# Patient Record
Sex: FEMALE | Race: WHITE | Employment: OTHER | URBAN - METROPOLITAN AREA
[De-identification: names, ages, dates, MRNs, and addresses within clinical notes are randomized per-mention and may not be internally consistent; named-entity substitution may affect disease eponyms.]

---

## 2020-10-29 ENCOUNTER — HOSPITAL ENCOUNTER (OUTPATIENT)
Dept: PREADMISSION TESTING | Age: 80
Discharge: HOME | End: 2020-11-02
Payer: MEDICARE

## 2020-10-29 LAB
ABO/RH: NORMAL
ANION GAP SERPL CALCULATED.3IONS-SCNC: 11 MMOL/L (ref 3–16)
ANTIBODY SCREEN: NORMAL
APTT: 55.2 SEC (ref 24.2–36.2)
BUN BLDV-MCNC: 28 MG/DL (ref 7–20)
CALCIUM SERPL-MCNC: 9.3 MG/DL (ref 8.3–10.6)
CHLORIDE BLD-SCNC: 106 MMOL/L (ref 99–110)
CO2: 25 MMOL/L (ref 21–32)
CREAT SERPL-MCNC: 1.7 MG/DL (ref 0.6–1.2)
EKG ATRIAL RATE: 375 BPM
EKG DIAGNOSIS: NORMAL
EKG Q-T INTERVAL: 408 MS
EKG QRS DURATION: 84 MS
EKG QTC CALCULATION (BAZETT): 433 MS
EKG R AXIS: 70 DEGREES
EKG T AXIS: -6 DEGREES
EKG VENTRICULAR RATE: 68 BPM
GFR AFRICAN AMERICAN: 35
GFR NON-AFRICAN AMERICAN: 29
GLUCOSE BLD-MCNC: 125 MG/DL (ref 70–99)
HCT VFR BLD CALC: 30.6 % (ref 36–48)
HEMOGLOBIN: 10.4 G/DL (ref 12–16)
INR BLD: 1.82 (ref 0.86–1.14)
MCH RBC QN AUTO: 30.5 PG (ref 26–34)
MCHC RBC AUTO-ENTMCNC: 34 G/DL (ref 31–36)
MCV RBC AUTO: 89.7 FL (ref 80–100)
PDW BLD-RTO: 14.3 % (ref 12.4–15.4)
PLATELET # BLD: 213 K/UL (ref 135–450)
PMV BLD AUTO: 9.4 FL (ref 5–10.5)
POTASSIUM SERPL-SCNC: 4.7 MMOL/L (ref 3.5–5.1)
PROTHROMBIN TIME: 21.2 SEC (ref 10–13.2)
RBC # BLD: 3.42 M/UL (ref 4–5.2)
SODIUM BLD-SCNC: 142 MMOL/L (ref 136–145)
WBC # BLD: 8.6 K/UL (ref 4–11)

## 2020-10-29 PROCEDURE — 80048 BASIC METABOLIC PNL TOTAL CA: CPT

## 2020-10-29 PROCEDURE — 86900 BLOOD TYPING SEROLOGIC ABO: CPT

## 2020-10-29 PROCEDURE — 93010 ELECTROCARDIOGRAM REPORT: CPT | Performed by: INTERNAL MEDICINE

## 2020-10-29 PROCEDURE — 85027 COMPLETE CBC AUTOMATED: CPT

## 2020-10-29 PROCEDURE — 86901 BLOOD TYPING SEROLOGIC RH(D): CPT

## 2020-10-29 PROCEDURE — 86850 RBC ANTIBODY SCREEN: CPT

## 2020-10-29 PROCEDURE — 85610 PROTHROMBIN TIME: CPT

## 2020-10-29 PROCEDURE — 93005 ELECTROCARDIOGRAM TRACING: CPT | Performed by: UROLOGY

## 2020-10-29 PROCEDURE — 85730 THROMBOPLASTIN TIME PARTIAL: CPT

## 2020-10-30 RX ORDER — FLUTICASONE PROPIONATE 50 MCG
2 SPRAY, SUSPENSION (ML) NASAL DAILY PRN
COMMUNITY
Start: 2020-07-20

## 2020-10-30 RX ORDER — LANOLIN ALCOHOL/MO/W.PET/CERES
1000 CREAM (GRAM) TOPICAL DAILY
COMMUNITY

## 2020-10-30 RX ORDER — FUROSEMIDE 20 MG/1
20 TABLET ORAL PRN
COMMUNITY
Start: 2020-10-12

## 2020-10-30 RX ORDER — PANTOPRAZOLE SODIUM 40 MG/1
40 TABLET, DELAYED RELEASE ORAL 2 TIMES DAILY
COMMUNITY
Start: 2020-07-07

## 2020-10-30 NOTE — PROGRESS NOTES
DR. Michelle MORENO'S OFFICE=PCP NOTIFIED TO REQUEST ADDEMDUM FOR H&P WHICH IS OVER 30 DAYS BY 1 DAY

## 2020-11-02 ENCOUNTER — ANESTHESIA EVENT (OUTPATIENT)
Dept: OPERATING ROOM | Age: 80
DRG: 657 | End: 2020-11-02
Payer: MEDICARE

## 2020-11-02 NOTE — PROGRESS NOTES
DR. Jeanine MORENO'S OFFICE CALLED AGAIN TO [facility] TO BE FAXED TO PAT OFFICE FOR HER SURGERY ON 11/3/2020-H&P OUT OF 30 DAYS BY 1 DAY

## 2020-11-03 ENCOUNTER — ANESTHESIA (OUTPATIENT)
Dept: OPERATING ROOM | Age: 80
DRG: 657 | End: 2020-11-03
Payer: MEDICARE

## 2020-11-03 ENCOUNTER — HOSPITAL ENCOUNTER (INPATIENT)
Age: 80
LOS: 7 days | Discharge: HOME | DRG: 657 | End: 2020-11-10
Attending: UROLOGY | Admitting: UROLOGY
Payer: MEDICARE

## 2020-11-03 VITALS
DIASTOLIC BLOOD PRESSURE: 56 MMHG | SYSTOLIC BLOOD PRESSURE: 108 MMHG | OXYGEN SATURATION: 100 % | TEMPERATURE: 96.3 F | RESPIRATION RATE: 10 BRPM

## 2020-11-03 PROBLEM — C64.9 RENAL CELL CANCER, UNSPECIFIED LATERALITY (HCC): Status: ACTIVE | Noted: 2020-11-03

## 2020-11-03 LAB
ABO/RH: NORMAL
ANION GAP SERPL CALCULATED.3IONS-SCNC: 13 MMOL/L (ref 3–16)
ANTIBODY SCREEN: NORMAL
BUN BLDV-MCNC: 30 MG/DL (ref 7–20)
CALCIUM SERPL-MCNC: 7.9 MG/DL (ref 8.3–10.6)
CHLORIDE BLD-SCNC: 106 MMOL/L (ref 99–110)
CO2: 20 MMOL/L (ref 21–32)
CREAT SERPL-MCNC: 1.6 MG/DL (ref 0.6–1.2)
GFR AFRICAN AMERICAN: 37
GFR NON-AFRICAN AMERICAN: 31
GLUCOSE BLD-MCNC: 118 MG/DL (ref 70–99)
GLUCOSE BLD-MCNC: 167 MG/DL (ref 70–99)
GLUCOSE BLD-MCNC: 184 MG/DL (ref 70–99)
GLUCOSE BLD-MCNC: 185 MG/DL (ref 70–99)
HCT VFR BLD CALC: 25 % (ref 36–48)
HEMOGLOBIN: 8.5 G/DL (ref 12–16)
PERFORMED ON: ABNORMAL
POTASSIUM SERPL-SCNC: 4.8 MMOL/L (ref 3.5–5.1)
SODIUM BLD-SCNC: 139 MMOL/L (ref 136–145)

## 2020-11-03 PROCEDURE — 6370000000 HC RX 637 (ALT 250 FOR IP): Performed by: NURSE ANESTHETIST, CERTIFIED REGISTERED

## 2020-11-03 PROCEDURE — 36415 COLL VENOUS BLD VENIPUNCTURE: CPT

## 2020-11-03 PROCEDURE — 2580000003 HC RX 258: Performed by: UROLOGY

## 2020-11-03 PROCEDURE — C9290 INJ, BUPIVACAINE LIPOSOME: HCPCS | Performed by: UROLOGY

## 2020-11-03 PROCEDURE — 88331 PATH CONSLTJ SURG 1 BLK 1SPC: CPT

## 2020-11-03 PROCEDURE — 6360000002 HC RX W HCPCS: Performed by: UROLOGY

## 2020-11-03 PROCEDURE — 6360000002 HC RX W HCPCS: Performed by: ANESTHESIOLOGY

## 2020-11-03 PROCEDURE — 88307 TISSUE EXAM BY PATHOLOGIST: CPT

## 2020-11-03 PROCEDURE — 2500000003 HC RX 250 WO HCPCS: Performed by: NURSE ANESTHETIST, CERTIFIED REGISTERED

## 2020-11-03 PROCEDURE — 86901 BLOOD TYPING SEROLOGIC RH(D): CPT

## 2020-11-03 PROCEDURE — 86850 RBC ANTIBODY SCREEN: CPT

## 2020-11-03 PROCEDURE — 85018 HEMOGLOBIN: CPT

## 2020-11-03 PROCEDURE — 2780000010 HC IMPLANT OTHER: Performed by: UROLOGY

## 2020-11-03 PROCEDURE — 3600000015 HC SURGERY LEVEL 5 ADDTL 15MIN: Performed by: UROLOGY

## 2020-11-03 PROCEDURE — 3700000001 HC ADD 15 MINUTES (ANESTHESIA): Performed by: UROLOGY

## 2020-11-03 PROCEDURE — 2580000003 HC RX 258: Performed by: ANESTHESIOLOGY

## 2020-11-03 PROCEDURE — 85014 HEMATOCRIT: CPT

## 2020-11-03 PROCEDURE — 3600000005 HC SURGERY LEVEL 5 BASE: Performed by: UROLOGY

## 2020-11-03 PROCEDURE — 1200000000 HC SEMI PRIVATE

## 2020-11-03 PROCEDURE — 86900 BLOOD TYPING SEROLOGIC ABO: CPT

## 2020-11-03 PROCEDURE — 6360000002 HC RX W HCPCS: Performed by: NURSE ANESTHETIST, CERTIFIED REGISTERED

## 2020-11-03 PROCEDURE — P9045 ALBUMIN (HUMAN), 5%, 250 ML: HCPCS | Performed by: NURSE ANESTHETIST, CERTIFIED REGISTERED

## 2020-11-03 PROCEDURE — 80048 BASIC METABOLIC PNL TOTAL CA: CPT

## 2020-11-03 PROCEDURE — 2500000003 HC RX 250 WO HCPCS

## 2020-11-03 PROCEDURE — 2580000003 HC RX 258: Performed by: NURSE ANESTHETIST, CERTIFIED REGISTERED

## 2020-11-03 PROCEDURE — 2709999900 HC NON-CHARGEABLE SUPPLY: Performed by: UROLOGY

## 2020-11-03 PROCEDURE — 7100000000 HC PACU RECOVERY - FIRST 15 MIN: Performed by: UROLOGY

## 2020-11-03 PROCEDURE — 3700000000 HC ANESTHESIA ATTENDED CARE: Performed by: UROLOGY

## 2020-11-03 PROCEDURE — 0TB10ZZ EXCISION OF LEFT KIDNEY, OPEN APPROACH: ICD-10-PCS | Performed by: UROLOGY

## 2020-11-03 PROCEDURE — 7100000001 HC PACU RECOVERY - ADDTL 15 MIN: Performed by: UROLOGY

## 2020-11-03 PROCEDURE — 6370000000 HC RX 637 (ALT 250 FOR IP): Performed by: UROLOGY

## 2020-11-03 RX ORDER — OXYCODONE HYDROCHLORIDE 10 MG/1
10 TABLET ORAL PRN
Status: DISCONTINUED | OUTPATIENT
Start: 2020-11-03 | End: 2020-11-03 | Stop reason: HOSPADM

## 2020-11-03 RX ORDER — PROMETHAZINE HYDROCHLORIDE 25 MG/1
12.5 TABLET ORAL EVERY 6 HOURS PRN
Status: DISCONTINUED | OUTPATIENT
Start: 2020-11-03 | End: 2020-11-10 | Stop reason: HOSPADM

## 2020-11-03 RX ORDER — MEPERIDINE HYDROCHLORIDE 25 MG/ML
12.5 INJECTION INTRAMUSCULAR; INTRAVENOUS; SUBCUTANEOUS EVERY 5 MIN PRN
Status: DISCONTINUED | OUTPATIENT
Start: 2020-11-03 | End: 2020-11-03 | Stop reason: HOSPADM

## 2020-11-03 RX ORDER — FENTANYL CITRATE 50 UG/ML
INJECTION, SOLUTION INTRAMUSCULAR; INTRAVENOUS PRN
Status: DISCONTINUED | OUTPATIENT
Start: 2020-11-03 | End: 2020-11-03 | Stop reason: SDUPTHER

## 2020-11-03 RX ORDER — ONDANSETRON 2 MG/ML
4 INJECTION INTRAMUSCULAR; INTRAVENOUS
Status: DISCONTINUED | OUTPATIENT
Start: 2020-11-03 | End: 2020-11-03 | Stop reason: HOSPADM

## 2020-11-03 RX ORDER — DEXTROSE MONOHYDRATE 50 MG/ML
100 INJECTION, SOLUTION INTRAVENOUS PRN
Status: DISCONTINUED | OUTPATIENT
Start: 2020-11-03 | End: 2020-11-10 | Stop reason: HOSPADM

## 2020-11-03 RX ORDER — ACETAMINOPHEN 325 MG/1
TABLET ORAL PRN
Status: DISCONTINUED | OUTPATIENT
Start: 2020-11-03 | End: 2020-11-03 | Stop reason: SDUPTHER

## 2020-11-03 RX ORDER — DEXTROSE MONOHYDRATE 25 G/50ML
12.5 INJECTION, SOLUTION INTRAVENOUS PRN
Status: DISCONTINUED | OUTPATIENT
Start: 2020-11-03 | End: 2020-11-10 | Stop reason: HOSPADM

## 2020-11-03 RX ORDER — MAGNESIUM HYDROXIDE 1200 MG/15ML
LIQUID ORAL CONTINUOUS PRN
Status: COMPLETED | OUTPATIENT
Start: 2020-11-03 | End: 2020-11-03

## 2020-11-03 RX ORDER — LIDOCAINE HYDROCHLORIDE 20 MG/ML
INJECTION, SOLUTION EPIDURAL; INFILTRATION; INTRACAUDAL; PERINEURAL PRN
Status: DISCONTINUED | OUTPATIENT
Start: 2020-11-03 | End: 2020-11-03 | Stop reason: SDUPTHER

## 2020-11-03 RX ORDER — KETAMINE HCL IN NACL, ISO-OSM 100MG/10ML
SYRINGE (ML) INJECTION PRN
Status: DISCONTINUED | OUTPATIENT
Start: 2020-11-03 | End: 2020-11-03 | Stop reason: SDUPTHER

## 2020-11-03 RX ORDER — EPHEDRINE SULFATE/0.9% NACL/PF 50 MG/5 ML
SYRINGE (ML) INTRAVENOUS PRN
Status: DISCONTINUED | OUTPATIENT
Start: 2020-11-03 | End: 2020-11-03 | Stop reason: SDUPTHER

## 2020-11-03 RX ORDER — SODIUM CHLORIDE 0.9 % (FLUSH) 0.9 %
10 SYRINGE (ML) INJECTION EVERY 12 HOURS SCHEDULED
Status: DISCONTINUED | OUTPATIENT
Start: 2020-11-03 | End: 2020-11-03 | Stop reason: HOSPADM

## 2020-11-03 RX ORDER — SODIUM CHLORIDE 9 MG/ML
INJECTION, SOLUTION INTRAVENOUS CONTINUOUS PRN
Status: DISCONTINUED | OUTPATIENT
Start: 2020-11-03 | End: 2020-11-03 | Stop reason: SDUPTHER

## 2020-11-03 RX ORDER — NORTRIPTYLINE HYDROCHLORIDE 10 MG/1
10 CAPSULE ORAL NIGHTLY
Status: DISCONTINUED | OUTPATIENT
Start: 2020-11-03 | End: 2020-11-10 | Stop reason: HOSPADM

## 2020-11-03 RX ORDER — ROCURONIUM BROMIDE 10 MG/ML
INJECTION, SOLUTION INTRAVENOUS PRN
Status: DISCONTINUED | OUTPATIENT
Start: 2020-11-03 | End: 2020-11-03 | Stop reason: SDUPTHER

## 2020-11-03 RX ORDER — SODIUM CHLORIDE 0.9 % (FLUSH) 0.9 %
10 SYRINGE (ML) INJECTION PRN
Status: DISCONTINUED | OUTPATIENT
Start: 2020-11-03 | End: 2020-11-03 | Stop reason: HOSPADM

## 2020-11-03 RX ORDER — MORPHINE SULFATE 2 MG/ML
2 INJECTION, SOLUTION INTRAMUSCULAR; INTRAVENOUS EVERY 5 MIN PRN
Status: DISCONTINUED | OUTPATIENT
Start: 2020-11-03 | End: 2020-11-03 | Stop reason: HOSPADM

## 2020-11-03 RX ORDER — MORPHINE SULFATE 4 MG/ML
4 INJECTION, SOLUTION INTRAMUSCULAR; INTRAVENOUS
Status: DISCONTINUED | OUTPATIENT
Start: 2020-11-03 | End: 2020-11-04

## 2020-11-03 RX ORDER — FENTANYL CITRATE 50 UG/ML
50 INJECTION, SOLUTION INTRAMUSCULAR; INTRAVENOUS EVERY 5 MIN PRN
Status: DISCONTINUED | OUTPATIENT
Start: 2020-11-03 | End: 2020-11-03 | Stop reason: HOSPADM

## 2020-11-03 RX ORDER — PANTOPRAZOLE SODIUM 40 MG/1
40 TABLET, DELAYED RELEASE ORAL
Status: DISCONTINUED | OUTPATIENT
Start: 2020-11-04 | End: 2020-11-10 | Stop reason: HOSPADM

## 2020-11-03 RX ORDER — MORPHINE SULFATE 2 MG/ML
1 INJECTION, SOLUTION INTRAMUSCULAR; INTRAVENOUS EVERY 5 MIN PRN
Status: DISCONTINUED | OUTPATIENT
Start: 2020-11-03 | End: 2020-11-03 | Stop reason: HOSPADM

## 2020-11-03 RX ORDER — OXYCODONE HYDROCHLORIDE 5 MG/1
5 TABLET ORAL EVERY 4 HOURS PRN
Status: DISCONTINUED | OUTPATIENT
Start: 2020-11-03 | End: 2020-11-04

## 2020-11-03 RX ORDER — DEXMEDETOMIDINE HYDROCHLORIDE 100 UG/ML
INJECTION, SOLUTION INTRAVENOUS PRN
Status: DISCONTINUED | OUTPATIENT
Start: 2020-11-03 | End: 2020-11-03 | Stop reason: SDUPTHER

## 2020-11-03 RX ORDER — MORPHINE SULFATE 2 MG/ML
2 INJECTION, SOLUTION INTRAMUSCULAR; INTRAVENOUS
Status: DISCONTINUED | OUTPATIENT
Start: 2020-11-03 | End: 2020-11-04

## 2020-11-03 RX ORDER — FENTANYL CITRATE 50 UG/ML
25 INJECTION, SOLUTION INTRAMUSCULAR; INTRAVENOUS EVERY 5 MIN PRN
Status: DISCONTINUED | OUTPATIENT
Start: 2020-11-03 | End: 2020-11-03 | Stop reason: HOSPADM

## 2020-11-03 RX ORDER — PREGABALIN 75 MG/1
150 CAPSULE ORAL 3 TIMES DAILY
Status: DISCONTINUED | OUTPATIENT
Start: 2020-11-03 | End: 2020-11-06

## 2020-11-03 RX ORDER — PHENYLEPHRINE HCL IN 0.9% NACL 1 MG/10 ML
SYRINGE (ML) INTRAVENOUS PRN
Status: DISCONTINUED | OUTPATIENT
Start: 2020-11-03 | End: 2020-11-03 | Stop reason: SDUPTHER

## 2020-11-03 RX ORDER — DEXAMETHASONE SODIUM PHOSPHATE 4 MG/ML
INJECTION, SOLUTION INTRA-ARTICULAR; INTRALESIONAL; INTRAMUSCULAR; INTRAVENOUS; SOFT TISSUE PRN
Status: DISCONTINUED | OUTPATIENT
Start: 2020-11-03 | End: 2020-11-03 | Stop reason: SDUPTHER

## 2020-11-03 RX ORDER — ONDANSETRON 2 MG/ML
INJECTION INTRAMUSCULAR; INTRAVENOUS PRN
Status: DISCONTINUED | OUTPATIENT
Start: 2020-11-03 | End: 2020-11-03 | Stop reason: SDUPTHER

## 2020-11-03 RX ORDER — MANNITOL 250 MG/ML
INJECTION, SOLUTION INTRAVENOUS PRN
Status: DISCONTINUED | OUTPATIENT
Start: 2020-11-03 | End: 2020-11-03 | Stop reason: SDUPTHER

## 2020-11-03 RX ORDER — SODIUM CHLORIDE 0.9 % (FLUSH) 0.9 %
10 SYRINGE (ML) INJECTION EVERY 12 HOURS SCHEDULED
Status: DISCONTINUED | OUTPATIENT
Start: 2020-11-03 | End: 2020-11-10 | Stop reason: HOSPADM

## 2020-11-03 RX ORDER — SODIUM CHLORIDE 9 MG/ML
INJECTION, SOLUTION INTRAVENOUS CONTINUOUS
Status: DISCONTINUED | OUTPATIENT
Start: 2020-11-03 | End: 2020-11-07

## 2020-11-03 RX ORDER — OXYCODONE HYDROCHLORIDE 5 MG/1
5 TABLET ORAL PRN
Status: DISCONTINUED | OUTPATIENT
Start: 2020-11-03 | End: 2020-11-03 | Stop reason: HOSPADM

## 2020-11-03 RX ORDER — ALBUMIN, HUMAN INJ 5% 5 %
SOLUTION INTRAVENOUS PRN
Status: DISCONTINUED | OUTPATIENT
Start: 2020-11-03 | End: 2020-11-03 | Stop reason: SDUPTHER

## 2020-11-03 RX ORDER — SODIUM CHLORIDE 0.9 % (FLUSH) 0.9 %
10 SYRINGE (ML) INJECTION PRN
Status: DISCONTINUED | OUTPATIENT
Start: 2020-11-03 | End: 2020-11-10 | Stop reason: HOSPADM

## 2020-11-03 RX ORDER — FLUTICASONE PROPIONATE 50 MCG
2 SPRAY, SUSPENSION (ML) NASAL DAILY
Status: DISCONTINUED | OUTPATIENT
Start: 2020-11-04 | End: 2020-11-10 | Stop reason: HOSPADM

## 2020-11-03 RX ORDER — ONDANSETRON 2 MG/ML
4 INJECTION INTRAMUSCULAR; INTRAVENOUS EVERY 6 HOURS PRN
Status: DISCONTINUED | OUTPATIENT
Start: 2020-11-03 | End: 2020-11-10 | Stop reason: HOSPADM

## 2020-11-03 RX ORDER — PROPOFOL 10 MG/ML
INJECTION, EMULSION INTRAVENOUS PRN
Status: DISCONTINUED | OUTPATIENT
Start: 2020-11-03 | End: 2020-11-03 | Stop reason: SDUPTHER

## 2020-11-03 RX ORDER — SODIUM CHLORIDE 9 MG/ML
INJECTION, SOLUTION INTRAVENOUS CONTINUOUS
Status: DISCONTINUED | OUTPATIENT
Start: 2020-11-03 | End: 2020-11-03

## 2020-11-03 RX ORDER — GLYCOPYRROLATE 0.2 MG/ML
INJECTION INTRAMUSCULAR; INTRAVENOUS PRN
Status: DISCONTINUED | OUTPATIENT
Start: 2020-11-03 | End: 2020-11-03 | Stop reason: SDUPTHER

## 2020-11-03 RX ORDER — NICOTINE POLACRILEX 4 MG
15 LOZENGE BUCCAL PRN
Status: DISCONTINUED | OUTPATIENT
Start: 2020-11-03 | End: 2020-11-10 | Stop reason: HOSPADM

## 2020-11-03 RX ADMIN — NORTRIPTYLINE HYDROCHLORIDE 10 MG: 10 CAPSULE ORAL at 21:08

## 2020-11-03 RX ADMIN — LIDOCAINE HYDROCHLORIDE 100 MG: 20 INJECTION, SOLUTION EPIDURAL; INFILTRATION; INTRACAUDAL; PERINEURAL at 14:08

## 2020-11-03 RX ADMIN — SODIUM CHLORIDE: 9 INJECTION, SOLUTION INTRAVENOUS at 16:39

## 2020-11-03 RX ADMIN — SODIUM CHLORIDE: 9 INJECTION, SOLUTION INTRAVENOUS at 14:18

## 2020-11-03 RX ADMIN — SODIUM CHLORIDE: 9 INJECTION, SOLUTION INTRAVENOUS at 20:56

## 2020-11-03 RX ADMIN — Medication 10 MG: at 16:00

## 2020-11-03 RX ADMIN — DEXAMETHASONE SODIUM PHOSPHATE 4 MG: 4 INJECTION, SOLUTION INTRAMUSCULAR; INTRAVENOUS at 14:39

## 2020-11-03 RX ADMIN — Medication 10 MG: at 14:29

## 2020-11-03 RX ADMIN — DEXMEDETOMIDINE 0.3 MCG: 100 INJECTION, SOLUTION, CONCENTRATE INTRAVENOUS at 16:36

## 2020-11-03 RX ADMIN — HYDROMORPHONE HYDROCHLORIDE 0.5 MG: 1 INJECTION, SOLUTION INTRAMUSCULAR; INTRAVENOUS; SUBCUTANEOUS at 17:54

## 2020-11-03 RX ADMIN — Medication 10 MG: at 16:42

## 2020-11-03 RX ADMIN — HYDROMORPHONE HYDROCHLORIDE 0.5 MG: 1 INJECTION, SOLUTION INTRAMUSCULAR; INTRAVENOUS; SUBCUTANEOUS at 17:31

## 2020-11-03 RX ADMIN — Medication 100 MCG: at 14:36

## 2020-11-03 RX ADMIN — ONDANSETRON 4 MG: 2 INJECTION INTRAMUSCULAR; INTRAVENOUS at 14:39

## 2020-11-03 RX ADMIN — INSULIN LISPRO 1 UNITS: 100 INJECTION, SOLUTION INTRAVENOUS; SUBCUTANEOUS at 20:57

## 2020-11-03 RX ADMIN — ROCURONIUM BROMIDE 10 MG: 10 INJECTION INTRAVENOUS at 14:08

## 2020-11-03 RX ADMIN — Medication 10 MG: at 14:32

## 2020-11-03 RX ADMIN — FENTANYL CITRATE 50 MCG: 50 INJECTION, SOLUTION INTRAMUSCULAR; INTRAVENOUS at 17:24

## 2020-11-03 RX ADMIN — SUGAMMADEX 250 MG: 100 INJECTION, SOLUTION INTRAVENOUS at 16:37

## 2020-11-03 RX ADMIN — PROPOFOL 150 MG: 10 INJECTION, EMULSION INTRAVENOUS at 14:08

## 2020-11-03 RX ADMIN — MANNITOL 12.5 G: 250 INJECTION, SOLUTION INTRAVENOUS at 15:41

## 2020-11-03 RX ADMIN — MORPHINE SULFATE 4 MG: 4 INJECTION INTRAVENOUS at 21:12

## 2020-11-03 RX ADMIN — Medication 20 MG: at 14:07

## 2020-11-03 RX ADMIN — ACETAMINOPHEN 1000 MG: 325 TABLET ORAL at 16:38

## 2020-11-03 RX ADMIN — DEXMEDETOMIDINE 0.5 MCG: 100 INJECTION, SOLUTION, CONCENTRATE INTRAVENOUS at 14:53

## 2020-11-03 RX ADMIN — FENTANYL CITRATE 25 MCG: 50 INJECTION INTRAMUSCULAR; INTRAVENOUS at 14:02

## 2020-11-03 RX ADMIN — HYDROMORPHONE HYDROCHLORIDE 0.5 MG: 1 INJECTION, SOLUTION INTRAMUSCULAR; INTRAVENOUS; SUBCUTANEOUS at 17:46

## 2020-11-03 RX ADMIN — DEXMEDETOMIDINE 0.3 MCG: 100 INJECTION, SOLUTION, CONCENTRATE INTRAVENOUS at 16:28

## 2020-11-03 RX ADMIN — GLYCOPYRROLATE 0.2 MG: 0.2 INJECTION, SOLUTION INTRAMUSCULAR; INTRAVENOUS at 14:00

## 2020-11-03 RX ADMIN — Medication 10 ML: at 21:10

## 2020-11-03 RX ADMIN — CEFAZOLIN SODIUM 2 G: 10 INJECTION, POWDER, FOR SOLUTION INTRAVENOUS at 20:56

## 2020-11-03 RX ADMIN — Medication 10 MG: at 15:25

## 2020-11-03 RX ADMIN — ROCURONIUM BROMIDE 40 MG: 10 INJECTION INTRAVENOUS at 14:09

## 2020-11-03 RX ADMIN — PREGABALIN 150 MG: 75 CAPSULE ORAL at 20:56

## 2020-11-03 RX ADMIN — ROCURONIUM BROMIDE 30 MG: 10 INJECTION INTRAVENOUS at 15:48

## 2020-11-03 RX ADMIN — MORPHINE SULFATE 4 MG: 4 INJECTION INTRAVENOUS at 18:59

## 2020-11-03 RX ADMIN — FENTANYL CITRATE 25 MCG: 50 INJECTION INTRAMUSCULAR; INTRAVENOUS at 14:00

## 2020-11-03 RX ADMIN — DEXMEDETOMIDINE 0.3 MCG: 100 INJECTION, SOLUTION, CONCENTRATE INTRAVENOUS at 14:30

## 2020-11-03 RX ADMIN — FENTANYL CITRATE 50 MCG: 50 INJECTION, SOLUTION INTRAMUSCULAR; INTRAVENOUS at 17:16

## 2020-11-03 RX ADMIN — CEFTRIAXONE 2 G: 2 INJECTION, POWDER, FOR SOLUTION INTRAMUSCULAR; INTRAVENOUS at 14:01

## 2020-11-03 RX ADMIN — DEXMEDETOMIDINE 0.7 MCG: 100 INJECTION, SOLUTION, CONCENTRATE INTRAVENOUS at 14:20

## 2020-11-03 RX ADMIN — SODIUM CHLORIDE: 9 INJECTION, SOLUTION INTRAVENOUS at 12:33

## 2020-11-03 RX ADMIN — ALBUMIN (HUMAN) 250 ML: 12.5 SOLUTION INTRAVENOUS at 15:20

## 2020-11-03 RX ADMIN — OXYCODONE HYDROCHLORIDE 5 MG: 5 TABLET ORAL at 23:58

## 2020-11-03 RX ADMIN — HYDROMORPHONE HYDROCHLORIDE 0.5 MG: 1 INJECTION, SOLUTION INTRAMUSCULAR; INTRAVENOUS; SUBCUTANEOUS at 17:39

## 2020-11-03 ASSESSMENT — PULMONARY FUNCTION TESTS
PIF_VALUE: 24
PIF_VALUE: 17
PIF_VALUE: 24
PIF_VALUE: 21
PIF_VALUE: 23
PIF_VALUE: 22
PIF_VALUE: 24
PIF_VALUE: 22
PIF_VALUE: 18
PIF_VALUE: 25
PIF_VALUE: 17
PIF_VALUE: 24
PIF_VALUE: 25
PIF_VALUE: 21
PIF_VALUE: 24
PIF_VALUE: 1
PIF_VALUE: 22
PIF_VALUE: 3
PIF_VALUE: 19
PIF_VALUE: 17
PIF_VALUE: 1
PIF_VALUE: 5
PIF_VALUE: 18
PIF_VALUE: 31
PIF_VALUE: 21
PIF_VALUE: 20
PIF_VALUE: 18
PIF_VALUE: 23
PIF_VALUE: 20
PIF_VALUE: 24
PIF_VALUE: 24
PIF_VALUE: 21
PIF_VALUE: 25
PIF_VALUE: 24
PIF_VALUE: 24
PIF_VALUE: 20
PIF_VALUE: 24
PIF_VALUE: 24
PIF_VALUE: 21
PIF_VALUE: 23
PIF_VALUE: 21
PIF_VALUE: 4
PIF_VALUE: 20
PIF_VALUE: 25
PIF_VALUE: 18
PIF_VALUE: 19
PIF_VALUE: 20
PIF_VALUE: 22
PIF_VALUE: 19
PIF_VALUE: 19
PIF_VALUE: 24
PIF_VALUE: 24
PIF_VALUE: 20
PIF_VALUE: 24
PIF_VALUE: 24
PIF_VALUE: 19
PIF_VALUE: 23
PIF_VALUE: 24
PIF_VALUE: 1
PIF_VALUE: 24
PIF_VALUE: 20
PIF_VALUE: 24
PIF_VALUE: 18
PIF_VALUE: 24
PIF_VALUE: 21
PIF_VALUE: 20
PIF_VALUE: 22
PIF_VALUE: 24
PIF_VALUE: 20
PIF_VALUE: 23
PIF_VALUE: 24
PIF_VALUE: 25
PIF_VALUE: 20
PIF_VALUE: 23
PIF_VALUE: 22
PIF_VALUE: 1
PIF_VALUE: 20
PIF_VALUE: 18
PIF_VALUE: 24
PIF_VALUE: 20
PIF_VALUE: 24
PIF_VALUE: 21
PIF_VALUE: 20
PIF_VALUE: 18
PIF_VALUE: 20
PIF_VALUE: 24
PIF_VALUE: 19
PIF_VALUE: 21
PIF_VALUE: 25
PIF_VALUE: 21
PIF_VALUE: 20
PIF_VALUE: 18
PIF_VALUE: 19
PIF_VALUE: 24
PIF_VALUE: 23
PIF_VALUE: 25
PIF_VALUE: 11
PIF_VALUE: 1
PIF_VALUE: 20
PIF_VALUE: 23
PIF_VALUE: 4
PIF_VALUE: 18
PIF_VALUE: 20
PIF_VALUE: 24
PIF_VALUE: 20
PIF_VALUE: 23
PIF_VALUE: 17
PIF_VALUE: 1
PIF_VALUE: 24
PIF_VALUE: 20
PIF_VALUE: 17
PIF_VALUE: 23
PIF_VALUE: 19
PIF_VALUE: 23
PIF_VALUE: 19
PIF_VALUE: 23
PIF_VALUE: 20
PIF_VALUE: 22
PIF_VALUE: 19
PIF_VALUE: 20
PIF_VALUE: 23
PIF_VALUE: 2
PIF_VALUE: 24
PIF_VALUE: 23
PIF_VALUE: 21
PIF_VALUE: 19
PIF_VALUE: 24
PIF_VALUE: 19
PIF_VALUE: 3
PIF_VALUE: 23
PIF_VALUE: 4
PIF_VALUE: 24
PIF_VALUE: 19
PIF_VALUE: 20
PIF_VALUE: 23
PIF_VALUE: 24
PIF_VALUE: 21
PIF_VALUE: 24
PIF_VALUE: 21
PIF_VALUE: 18
PIF_VALUE: 24
PIF_VALUE: 25
PIF_VALUE: 24
PIF_VALUE: 19
PIF_VALUE: 21
PIF_VALUE: 2
PIF_VALUE: 18
PIF_VALUE: 7
PIF_VALUE: 18
PIF_VALUE: 3
PIF_VALUE: 24
PIF_VALUE: 20
PIF_VALUE: 25
PIF_VALUE: 22
PIF_VALUE: 1
PIF_VALUE: 18
PIF_VALUE: 19
PIF_VALUE: 24
PIF_VALUE: 19
PIF_VALUE: 23
PIF_VALUE: 3
PIF_VALUE: 20
PIF_VALUE: 24

## 2020-11-03 ASSESSMENT — PAIN DESCRIPTION - LOCATION
LOCATION: FLANK

## 2020-11-03 ASSESSMENT — PAIN DESCRIPTION - FREQUENCY
FREQUENCY: CONTINUOUS

## 2020-11-03 ASSESSMENT — PAIN - FUNCTIONAL ASSESSMENT
PAIN_FUNCTIONAL_ASSESSMENT: PREVENTS OR INTERFERES SOME ACTIVE ACTIVITIES AND ADLS
PAIN_FUNCTIONAL_ASSESSMENT: 0-10

## 2020-11-03 ASSESSMENT — PAIN DESCRIPTION - ORIENTATION
ORIENTATION: LEFT

## 2020-11-03 ASSESSMENT — PAIN DESCRIPTION - ONSET
ONSET: ON-GOING

## 2020-11-03 ASSESSMENT — PAIN DESCRIPTION - DESCRIPTORS
DESCRIPTORS: ACHING;BURNING;CONSTANT
DESCRIPTORS: ACHING;BURNING;CONSTANT
DESCRIPTORS: ACHING
DESCRIPTORS: ACHING;BURNING;CONSTANT
DESCRIPTORS: ACHING;BURNING;CONSTANT
DESCRIPTORS: ACHING
DESCRIPTORS: ACHING
DESCRIPTORS: ACHING;BURNING;SHOOTING
DESCRIPTORS: ACHING;BURNING;CONSTANT

## 2020-11-03 ASSESSMENT — PAIN SCALES - GENERAL
PAINLEVEL_OUTOF10: 9
PAINLEVEL_OUTOF10: 6
PAINLEVEL_OUTOF10: 8
PAINLEVEL_OUTOF10: 9
PAINLEVEL_OUTOF10: 10

## 2020-11-03 ASSESSMENT — PAIN DESCRIPTION - PAIN TYPE
TYPE: SURGICAL PAIN

## 2020-11-03 ASSESSMENT — PAIN SCALES - WONG BAKER
WONGBAKER_NUMERICALRESPONSE: 0
WONGBAKER_NUMERICALRESPONSE: 0

## 2020-11-03 ASSESSMENT — PAIN DESCRIPTION - PROGRESSION
CLINICAL_PROGRESSION: GRADUALLY IMPROVING
CLINICAL_PROGRESSION: GRADUALLY WORSENING
CLINICAL_PROGRESSION: GRADUALLY WORSENING

## 2020-11-03 NOTE — BRIEF OP NOTE
Brief Postoperative Note      Patient: Lawanda Mendez  YOB: 1940  MRN: [id number]    Date of Procedure: 11/3/2020    Pre-Op Diagnosis:renal cell cancer     Post-Op Diagnosis: Same       Procedure(s):  LEFT OPEN PARTIAL NEPHRECTOMY    Surgeon(s):  Madeline Milan MD    Assistant:  Surgical Assistant: Josy Ernst Assistant: Zach Jeter    Anesthesia: General    Estimated Blood Loss (mL): 463 ml    Complications: None    Specimens:   ID Type Source Tests Collected by Time Destination   A : left upper colorenal mass - please identify clear margins Tissue Kidney SURGICAL PATHOLOGY Madeline Milan MD 11/3/2020 1545        Implants:  * No implants in log *      Drains:   Urethral Catheter Non-latex (Active)       Findings: as expected     Electronically signed by Brad Beltrán MD on 11/3/2020 at 4:57 PM

## 2020-11-03 NOTE — ANESTHESIA POSTPROCEDURE EVALUATION
Department of Anesthesiology  Postprocedure Note    Patient: Richard Abebe  MRN: [id number]  YOB: 1940  Date of evaluation: 11/3/2020  Time:  5:36 PM     Procedure Summary     Date:  11/03/20 Room / Location:  [facility]  / [facility]    Anesthesia Start:  1401 Anesthesia Stop:  7907    Procedure:  LEFT OPEN PARTIAL NEPHRECTOMY (Left ) Diagnosis:       Neoplasm of uncertain behavior of left kidney      (neoplasm of uncertain behavior of left kidney)    Surgeon:  Poppy Eaton MD Responsible Provider:  Saintclair Amos, MD    Anesthesia Type:  general ASA Status:  3          Anesthesia Type: general    Supa Phase I: Supa Score: 7    Supa Phase II:      Last vitals: Reviewed and per EMR flowsheets.        Anesthesia Post Evaluation    Patient location during evaluation: PACU  Patient participation: complete - patient participated  Level of consciousness: awake and alert  Pain score: 5  Airway patency: patent  Nausea & Vomiting: no nausea and no vomiting  Complications: no  Cardiovascular status: blood pressure returned to baseline  Respiratory status: acceptable  Hydration status: euvolemic

## 2020-11-03 NOTE — ANESTHESIA PRE PROCEDURE
Department of Anesthesiology  Preprocedure Note       Name:  Aisha Nguyen   Age:  78 y.o.  :  1940                                          MRN:  [id number]         Date:  11/3/2020      Surgeon: Radha Aquino):  Shiva Skaggs MD    Procedure: Procedure(s):  LEFT OPEN PARTIAL NEPHRECTOMY    Medications prior to admission:   Prior to Admission medications    Medication Sig Start Date End Date Taking?  Authorizing Provider   apixaban (ELIQUIS) 5 MG TABS tablet Take 5 mg by mouth 2 times daily 20  Yes Historical Provider, MD   furosemide (LASIX) 20 MG tablet Take 20 mg by mouth as needed 10/12/20  Yes Historical Provider, MD   pantoprazole (PROTONIX) 40 MG tablet Take 40 mg by mouth 2 times daily 20  Yes Historical Provider, MD   fluticasone (FLONASE) 50 MCG/ACT nasal spray 2 sprays by Nasal route daily 20  Yes Historical Provider, MD   vitamin B-12 (CYANOCOBALAMIN) 100 MCG tablet Take 50 mcg by mouth daily   Yes Historical Provider, MD   metFORMIN (GLUCOPHAGE) 500 MG tablet Take 500 mg by mouth 2 times daily (with meals)   Yes Historical Provider, MD   simvastatin (ZOCOR) 10 MG tablet Take 10 mg by mouth nightly   Yes Historical Provider, MD   diltiazem (CARDIZEM) 60 MG tablet Take 60 mg by mouth 2 times daily   Yes Historical Provider, MD   nortriptyline (PAMELOR) 10 MG capsule Take 10 mg by mouth nightly Indications: as directed   Yes Historical Provider, MD   traMADol (ULTRAM) 50 MG tablet Take 50 mg by mouth 4 times daily Indications: prn   Yes Historical Provider, MD   pregabalin (LYRICA) 150 MG capsule Take 150 mg by mouth three times daily   Yes Historical Provider, MD   Alpha-Lipoic Acid 600 MG CAPS Take 600 mg by mouth daily   Yes Historical Provider, MD       Current medications:    Current Facility-Administered Medications   Medication Dose Route Frequency Provider Last Rate Last Dose    cefTRIAXone (ROCEPHIN) 2 g IVPB in D5W 50ml minibag  2 g Intravenous Q24H Shiva Skaggs MD  0.9 % sodium chloride infusion   Intravenous Continuous Jan Castro MD        sodium chloride flush 0.9 % injection 10 mL  10 mL Intravenous 2 times per day Jan Castro MD        sodium chloride flush 0.9 % injection 10 mL  10 mL Intravenous PRN Jan Castro MD           Allergies:  No Known Allergies    Problem List:  There is no problem list on file for this patient. Past Medical History:        Diagnosis Date    [facility]     Diabetes mellitus ([facility]     Hyperlipidemia     Hypertension     Neuropathy     Pacemaker     Renal [facility] ([facility]        Past Surgical History:        Procedure Laterality Date    CHOLECYSTECTOMY      COLONOSCOPY      PACEMAKER PLACEMENT      TONSILLECTOMY         Social History:    Social History     Tobacco Use    Smoking status: Never Smoker    Smokeless tobacco: Never Used   Substance Use Topics    Alcohol use: Not Currently                                Counseling given: Not Answered      Vital Signs (Current):   Vitals:    10/30/20 1203   Weight: 174 lb (78.9 kg)   Height: 5' 5\" (1.651 m)                                              BP Readings from Last 3 Encounters:   10/17/16 117/70       NPO Status:                                                                                 BMI:   Wt Readings from Last 3 Encounters:   10/30/20 174 lb (78.9 kg)     Body mass index is 28.96 kg/m².     CBC:   Lab Results   Component Value Date    WBC 8.6 10/29/2020    RBC 3.42 10/29/2020    RBC 4.76 10/17/2016    HGB 10.4 10/29/2020    HCT 30.6 10/29/2020    MCV 89.7 10/29/2020    RDW 14.3 10/29/2020     10/29/2020       CMP:   Lab Results   Component Value Date     10/29/2020    K 4.7 10/29/2020     10/29/2020    CO2 25 10/29/2020    BUN 28 10/29/2020    CREATININE 1.7 10/29/2020    GFRAA 35 10/29/2020    LABGLOM 29 10/29/2020    GLUCOSE 125 10/29/2020    CALCIUM 9.3 10/29/2020       POC Tests: No results for input(s): POCGLU, POCNA, POCK, POCCL, POCBUN, POCHEMO, POCHCT in the last 72 hours. Coags:   Lab Results   Component Value Date    PROTIME 21.2 10/29/2020    INR 1.82 10/29/2020    APTT 55.2 10/29/2020       HCG (If Applicable): No results found for: PREGTESTUR, PREGSERUM, HCG, HCGQUANT     ABGs: No results found for: PHART, PO2ART, OAF1AVT, TIG8ZWY, BEART, T9DRYMEL     Type & Screen (If Applicable):  No results found for: LABABO, LABRH    Drug/Infectious Status (If Applicable):  No results found for: HIV, HEPCAB    COVID-19 Screening (If Applicable): No results found for: COVID19      Anesthesia Evaluation  Patient summary reviewed and Nursing notes reviewed no history of anesthetic complications:   Airway: Mallampati: II  TM distance: >3 FB   Neck ROM: full  Mouth opening: > = 3 FB Dental:    (+) partials  Comment: None of the remaining teeth are loose    Pulmonary:Negative Pulmonary ROS breath sounds clear to auscultation                             Cardiovascular:  Exercise tolerance: good (>4 METS),   (+) hypertension:, pacemaker:, hyperlipidemia    (-) valvular problems/murmurs, past MI, CAD, CABG/stent, dysrhythmias,  angina,  CHF, orthopnea and PND      Rhythm: regular                      Neuro/Psych:   Negative Neuro/Psych ROS              GI/Hepatic/Renal:        (-) hiatal hernia, GERD, PUD, hepatitis, liver disease, no renal disease, bowel prep and no morbid obesity       Endo/Other:    (+) Diabetes, blood dyscrasia: anticoagulation therapy:., malignancy/cancer (renal cell carcinoma). (-) hypothyroidism, hyperthyroidism, arthritis, no electrolyte abnormalities               Abdominal:           Vascular: negative vascular ROS. Anesthesia Plan      general     ASA 3       Induction: intravenous. MIPS: Postoperative opioids intended and Prophylactic antiemetics administered. Anesthetic plan and risks discussed with patient.     Use of blood products discussed with patient whom consented to blood products. Plan discussed with CRNA. Dianna Brunson MD   11/3/2020        This pre-anesthesia assessment may be used as a history and physical.    DOS STAFF ADDENDUM:    Pt seen and examined, chart reviewed (including anesthesia, drug and allergy history). No interval changes to history and physical examination. Anesthetic plan, risks, benefits, alternatives, and personnel involved discussed with patient. Patient verbalized an understanding and agrees to proceed.       Dianna Brunson MD  November 3, 2020  11:46 AM

## 2020-11-04 LAB
ANION GAP SERPL CALCULATED.3IONS-SCNC: 12 MMOL/L (ref 3–16)
BUN BLDV-MCNC: 29 MG/DL (ref 7–20)
CALCIUM SERPL-MCNC: 7.9 MG/DL (ref 8.3–10.6)
CHLORIDE BLD-SCNC: 108 MMOL/L (ref 99–110)
CO2: 17 MMOL/L (ref 21–32)
CREAT SERPL-MCNC: 1.7 MG/DL (ref 0.6–1.2)
GFR AFRICAN AMERICAN: 35
GFR NON-AFRICAN AMERICAN: 29
GLUCOSE BLD-MCNC: 107 MG/DL (ref 70–99)
GLUCOSE BLD-MCNC: 110 MG/DL (ref 70–99)
GLUCOSE BLD-MCNC: 156 MG/DL (ref 70–99)
GLUCOSE BLD-MCNC: 164 MG/DL (ref 70–99)
GLUCOSE BLD-MCNC: 76 MG/DL (ref 70–99)
HCT VFR BLD CALC: 26.7 % (ref 36–48)
HEMOGLOBIN: 8.6 G/DL (ref 12–16)
PERFORMED ON: ABNORMAL
PERFORMED ON: NORMAL
POTASSIUM SERPL-SCNC: 5.2 MMOL/L (ref 3.5–5.1)
SODIUM BLD-SCNC: 137 MMOL/L (ref 136–145)

## 2020-11-04 PROCEDURE — 6360000002 HC RX W HCPCS: Performed by: UROLOGY

## 2020-11-04 PROCEDURE — 85018 HEMOGLOBIN: CPT

## 2020-11-04 PROCEDURE — 80048 BASIC METABOLIC PNL TOTAL CA: CPT

## 2020-11-04 PROCEDURE — 2580000003 HC RX 258: Performed by: UROLOGY

## 2020-11-04 PROCEDURE — 36415 COLL VENOUS BLD VENIPUNCTURE: CPT

## 2020-11-04 PROCEDURE — 6370000000 HC RX 637 (ALT 250 FOR IP): Performed by: UROLOGY

## 2020-11-04 PROCEDURE — 85014 HEMATOCRIT: CPT

## 2020-11-04 PROCEDURE — 1200000000 HC SEMI PRIVATE

## 2020-11-04 RX ORDER — MORPHINE SULFATE 4 MG/ML
10 INJECTION, SOLUTION INTRAMUSCULAR; INTRAVENOUS
Status: DISCONTINUED | OUTPATIENT
Start: 2020-11-04 | End: 2020-11-06

## 2020-11-04 RX ORDER — OXYCODONE HYDROCHLORIDE 10 MG/1
10 TABLET ORAL EVERY 4 HOURS PRN
Status: DISCONTINUED | OUTPATIENT
Start: 2020-11-04 | End: 2020-11-10 | Stop reason: HOSPADM

## 2020-11-04 RX ADMIN — OXYCODONE HYDROCHLORIDE 10 MG: 10 TABLET ORAL at 23:24

## 2020-11-04 RX ADMIN — ENOXAPARIN SODIUM 30 MG: 30 INJECTION SUBCUTANEOUS at 08:33

## 2020-11-04 RX ADMIN — MORPHINE SULFATE 10 MG: 4 INJECTION, SOLUTION INTRAMUSCULAR; INTRAVENOUS at 15:04

## 2020-11-04 RX ADMIN — BISACODYL 5 MG: 5 TABLET, COATED ORAL at 08:33

## 2020-11-04 RX ADMIN — PANTOPRAZOLE SODIUM 40 MG: 40 TABLET, DELAYED RELEASE ORAL at 05:11

## 2020-11-04 RX ADMIN — OXYCODONE HYDROCHLORIDE 10 MG: 10 TABLET ORAL at 08:33

## 2020-11-04 RX ADMIN — SODIUM CHLORIDE: 9 INJECTION, SOLUTION INTRAVENOUS at 01:37

## 2020-11-04 RX ADMIN — NORTRIPTYLINE HYDROCHLORIDE 10 MG: 10 CAPSULE ORAL at 23:24

## 2020-11-04 RX ADMIN — PREGABALIN 150 MG: 75 CAPSULE ORAL at 15:04

## 2020-11-04 RX ADMIN — MORPHINE SULFATE 4 MG: 4 INJECTION INTRAVENOUS at 04:54

## 2020-11-04 RX ADMIN — MORPHINE SULFATE 4 MG: 4 INJECTION INTRAVENOUS at 01:37

## 2020-11-04 RX ADMIN — PREGABALIN 150 MG: 75 CAPSULE ORAL at 08:33

## 2020-11-04 RX ADMIN — CEFAZOLIN SODIUM 2 G: 10 INJECTION, POWDER, FOR SOLUTION INTRAVENOUS at 04:54

## 2020-11-04 RX ADMIN — INSULIN LISPRO 1 UNITS: 100 INJECTION, SOLUTION INTRAVENOUS; SUBCUTANEOUS at 08:33

## 2020-11-04 RX ADMIN — ONDANSETRON 4 MG: 2 INJECTION INTRAMUSCULAR; INTRAVENOUS at 07:48

## 2020-11-04 RX ADMIN — PANTOPRAZOLE SODIUM 40 MG: 40 TABLET, DELAYED RELEASE ORAL at 17:50

## 2020-11-04 RX ADMIN — MORPHINE SULFATE 4 MG: 4 INJECTION INTRAVENOUS at 07:50

## 2020-11-04 RX ADMIN — PREGABALIN 150 MG: 75 CAPSULE ORAL at 23:24

## 2020-11-04 RX ADMIN — MORPHINE SULFATE 10 MG: 4 INJECTION, SOLUTION INTRAMUSCULAR; INTRAVENOUS at 11:07

## 2020-11-04 RX ADMIN — INSULIN LISPRO 1 UNITS: 100 INJECTION, SOLUTION INTRAVENOUS; SUBCUTANEOUS at 12:50

## 2020-11-04 ASSESSMENT — PAIN DESCRIPTION - DESCRIPTORS
DESCRIPTORS: ACHING
DESCRIPTORS: ACHING

## 2020-11-04 ASSESSMENT — PAIN DESCRIPTION - PAIN TYPE
TYPE: SURGICAL PAIN
TYPE: SURGICAL PAIN

## 2020-11-04 ASSESSMENT — PAIN SCALES - GENERAL
PAINLEVEL_OUTOF10: 9
PAINLEVEL_OUTOF10: 10
PAINLEVEL_OUTOF10: 9
PAINLEVEL_OUTOF10: 10
PAINLEVEL_OUTOF10: 0
PAINLEVEL_OUTOF10: 4
PAINLEVEL_OUTOF10: 10
PAINLEVEL_OUTOF10: 6
PAINLEVEL_OUTOF10: 10
PAINLEVEL_OUTOF10: 6
PAINLEVEL_OUTOF10: 6

## 2020-11-04 ASSESSMENT — PAIN SCALES - WONG BAKER
WONGBAKER_NUMERICALRESPONSE: 0

## 2020-11-04 ASSESSMENT — PAIN DESCRIPTION - ORIENTATION
ORIENTATION: LEFT
ORIENTATION: LEFT

## 2020-11-04 ASSESSMENT — PAIN DESCRIPTION - ONSET
ONSET: ON-GOING
ONSET: ON-GOING

## 2020-11-04 ASSESSMENT — PAIN DESCRIPTION - PROGRESSION
CLINICAL_PROGRESSION: GRADUALLY WORSENING
CLINICAL_PROGRESSION: GRADUALLY IMPROVING

## 2020-11-04 ASSESSMENT — PAIN DESCRIPTION - LOCATION
LOCATION: FLANK
LOCATION: FLANK

## 2020-11-04 ASSESSMENT — PAIN - FUNCTIONAL ASSESSMENT
PAIN_FUNCTIONAL_ASSESSMENT: PREVENTS OR INTERFERES SOME ACTIVE ACTIVITIES AND ADLS
PAIN_FUNCTIONAL_ASSESSMENT: PREVENTS OR INTERFERES SOME ACTIVE ACTIVITIES AND ADLS

## 2020-11-04 ASSESSMENT — PAIN DESCRIPTION - FREQUENCY
FREQUENCY: CONTINUOUS
FREQUENCY: CONTINUOUS

## 2020-11-04 NOTE — PROGRESS NOTES
Pt. Assisted back to bed per Jack Frausto PCA pt. Position for comfort and her pillows and ice packs placed on left side on top of incision.

## 2020-11-04 NOTE — PROGRESS NOTES
Patient A&O in the bed. Patient tolerating PO intake well. PM medications given without complications. Pt complained of surgical pain 10/10, prn pain medication administered per MAR. Patient denies nausea or vomiting. Surgical dressing to left flank has shadow drainage, Cj drain draining bloody drainage. Roca remains in place draining yellow, clear urine. Call light within reach, able to make needs known, fall precautions in place. Will monitor. .Electronically signed by Jaxson Barrios RN on 11/4/2020 at 12:46 AM

## 2020-11-04 NOTE — OP NOTE
the renal vein and then posterior dissection along the aorta  did reveal the main renal artery. The renal artery and vein were  isolated with vessel loops. Gerota's fascia was now opened over the upper pole region and the  overlying perinephric fat was removed. The adrenal gland was mobilized  and reflected medially. The posterior upper pole mass was readily  identified and palpated. A margin of resection was then outlined on the  renal capsule with cautery. The patient received IV mannitol. The  renal artery and renal vein were then clamped with laparoscopic vascular  clamps. A 22-blade was then used to amputate the upper pole region with  an at least 5 to 10 mm margin. The dissection was carried down into the  upper pole renal sinus. There was good vascular control. Using 3-0 PDS, the open collecting  system was sewn closed. Additional PDS sutures were used on each side  of the renal sinus to oversew the vessels. The parenchymal margins were  then approximated using 0 Vicryl sutures with Lapra-Tys and Hem-o-kush  clips. Prior to approximating the parenchymal margins, a Surgiflo was  placed in the defect. The Hem-o-kush clips were then used to approximate  the parenchymal margins with good pressure, and then Lapra-Tys were used  to secure the closure. After 17 minutes, the venous and arterial clamps were removed. Good  hemostasis resulted. The pathologist cleared the deep parenchymal margin. The operative field was irrigated. Good hemostasis was confirmed. The  vessel loops were removed. A 7-mm Guilherme-Sahu drain was brought in  via a separate incision placed posterior to the kidney. Again, the  diaphragm and pleura were reinspected and there was no injury to the  pleura. The internus and transversus layers over the abdomen were closed with  running 0 Vicryl suture. Exparel was injected into the muscle layers  and subfascially.   A #1 loop PDS was used to close the external

## 2020-11-04 NOTE — PROGRESS NOTES
Pt Name: Florentin Waters  Medical Record Number: [id number]  Date of Birth 1940   Today's Date: 11/4/2020      Subjective:  Awake in bed, c/o pain to left flank about 9/10, RN coming with medication and also get oob. Tolerating clears, no nausea. No flatulence - patient reports BM usually q3days. Using IS. Staples intact. SANDY drain intact. ROS: Constitutional: No fever    Vitals:  Vitals:    11/03/20 1948 11/03/20 2350 11/04/20 0430 11/04/20 0736   BP: (!) 144/76 (!) 170/74 (!) 143/68 (!) 167/83   Pulse: 67 84 65 92   Resp: 14 18 16 15   Temp: 97.6 °F (36.4 °C) 98.9 °F (37.2 °C) 97.5 °F (36.4 °C) 97.6 °F (36.4 °C)   TempSrc: Oral Oral Oral Oral   SpO2: 95% 98% 98% 95%   Weight:   183 lb 6.8 oz (83.2 kg)    Height:         I/O last 3 completed shifts: In: 4873 [P.O.:300;  I.V.:3075; IV Piggyback:100]  Out: 2193 [Urine:1020; Drains:400; Blood:350]    Exam:  General: Awake, oriented, no acute distress  Respiratory: Nonlabored breathing  Abdomen: Soft, appropriately-tender, non-distended, no masses  : urethral catheter intact  Skin: Skin color, texture, turgor normal, no rashes or lesions  Neurologic: no gross deficits    CURRENT MEDICATIONS   Scheduled Meds:   fluticasone  2 spray Nasal Daily    nortriptyline  10 mg Oral Nightly    pantoprazole  40 mg Oral BID AC    pregabalin  150 mg Oral TID    sodium chloride flush  10 mL Intravenous 2 times per day    bisacodyl  5 mg Oral Daily    insulin lispro  0-6 Units Subcutaneous TID WC    insulin lispro  0-3 Units Subcutaneous Nightly    enoxaparin  30 mg Subcutaneous Daily     Continuous Infusions:   sodium chloride 125 mL/hr at 11/04/20 0137    dextrose       PRN Meds:.morphine, oxyCODONE, sodium chloride flush, promethazine **OR** ondansetron, glucose, dextrose, glucagon (rDNA), dextrose    LABS     Recent Labs     11/03/20  1735 11/04/20  0525   HGB 8.5* 8.6*   HCT 25.0* 26.7*    137   K 4.8 5.2*    108   CO2 20* 17*   BUN 30* 29* CREATININE 1.6* 1.7*   CALCIUM 7.9* 7.9*     ASSESSMENT   1. Hospital day # 1  2. 80y. o. female s/p left open partial nephrectomy with Dr. Vicente Means 11/3/2020  3. Labs stable  PLAN   1. Pain control  2. Wean oxygen, IS use. 3. OOB to chair and ambulate as tolerated x 3 today  4. Advance diet as tolerated  5. Discharge home in 1-2 days.      GAGE Montoya CNP 11/4/2020 9:02 AM

## 2020-11-04 NOTE — PLAN OF CARE
Problem: Falls - Risk of:  Goal: Will remain free from falls  Description: Will remain free from falls  11/4/2020 1856 by Gunner Singer RN  Outcome: Ongoing  Note: Fall risk assessment completed. Fall precautions in place. Call light within reach. Pt educated on calling for assistance before getting up. Walkway free of clutter. Will continue to monitor. 11/4/2020 1605 by Vimal Mondragon RN  Outcome: Ongoing  Goal: Absence of physical injury  Description: Absence of physical injury  11/4/2020 1856 by Gunner Singer RN  Outcome: Ongoing  11/4/2020 1605 by Vimal Mondragon RN  Outcome: Ongoing     Problem: Skin Integrity:  Goal: Will show no infection signs and symptoms  Description: Will show no infection signs and symptoms  11/4/2020 1856 by Gunner Singer RN  Outcome: Ongoing  Note: Will monitor for infection. 11/4/2020 1605 by Vimal Mondragon RN  Outcome: Ongoing  Goal: Absence of new skin breakdown  Description: Absence of new skin breakdown  11/4/2020 1856 by Gunner Singer RN  Outcome: Ongoing  11/4/2020 1605 by Vimal Mondragon RN  Outcome: Ongoing     Problem: Pain:  Goal: Pain level will decrease  Description: Pain level will decrease  11/4/2020 1856 by Gunner Singer RN  Outcome: Ongoing  Note: Pt assessed for pain. Pt in pain and assessed with 0-10 pain rating scale. Pt given prescribed analgesic for pain. (See eMar) Pt satisfied with pain relief thus far. Will reassess and continue to monitor.      11/4/2020 1605 by Vimal Mondragon RN  Outcome: Ongoing  Goal: Control of acute pain  Description: Control of acute pain  11/4/2020 1856 by Gunner Singer RN  Outcome: Ongoing  11/4/2020 1605 by Vimal Mondragon RN  Outcome: Ongoing  Goal: Control of chronic pain  Description: Control of chronic pain  11/4/2020 1856 by Gunner Singer RN  Outcome: Ongoing  11/4/2020 1605 by Vimal Mondragon RN  Outcome: Ongoing

## 2020-11-04 NOTE — PLAN OF CARE
Problem: Falls - Risk of:  Goal: Will remain free from falls  Description: Will remain free from falls  Outcome: Ongoing  Note: Patient educated on fall prevention. Call light is within reach, bed locked in lowest position, personal items within reach, and bed alarm is on. Will round on patient per unit guidelines. Goal: Absence of physical injury  Description: Absence of physical injury  Outcome: Ongoing  Note: Pt is free of injury. No injury noted. Fall precautions in place. Call light within reach. Will monitor. Problem: Skin Integrity:  Goal: Will show no infection signs and symptoms  Description: Will show no infection signs and symptoms  Outcome: Ongoing  Goal: Absence of new skin breakdown  Description: Absence of new skin breakdown  Outcome: Ongoing     Problem: Pain:  Goal: Pain level will decrease  Description: Pain level will decrease  Outcome: Ongoing  Note: Pain /discomfort being managed with PRN analgesics per MD orders. Patient able to express presence and absence of pain and rate pain appropriately using numerical scale.      Goal: Control of acute pain  Description: Control of acute pain  Outcome: Ongoing  Goal: Control of chronic pain  Description: Control of chronic pain  Outcome: Ongoing

## 2020-11-04 NOTE — PROGRESS NOTES
Patient A&O. No complaints at this time. Shift uneventful. Surgical dressing changed per order, new dressing CDI. SANDY drain draining dark bloody drainage. Out put measured and documented. Roca remains in place. Prn pain medication administered for pain, ice in place. Call light within reach, able to make needs knows, fall precautions in place. Will continue to monitor. .Electronically signed by Penny Sullivan RN on 11/4/2020 at 6:15 AM

## 2020-11-04 NOTE — PROGRESS NOTES
Patient arrived to unit from PACU and was placed into room 3110. Patient alert and oriented x4 but c/o 9/10 pain to the L flank. Patient provided 4mg IV morphine per request. Patient satisfied with this intervention. Admission questions completed; night shift RN Aissatou Sumner aware that assessment/ four eyes have not been completed at this time. Patient oriented to unit routine, meal ordering processes, dietary restrictions (clear liquids), call light/ telephone usage, and bed/chair alarm implementation. Patient and daughter report understanding. 70 mL of bloody drainage removed from SANDY drain. Drain recompressed. Patient tolerated well. Roca remains in place draining clear yellow urine; not enough urine in collection bag to empty at this time. Surgical dressing to L flank clean, dry, and intact. Patient denies physical/emotional needs at this time and would like to rest. Call light, telephone, and bed side table are within reach.  Lights off per pt request for comfort and rest.

## 2020-11-05 LAB
ANION GAP SERPL CALCULATED.3IONS-SCNC: 10 MMOL/L (ref 3–16)
BUN BLDV-MCNC: 25 MG/DL (ref 7–20)
CALCIUM SERPL-MCNC: 8.1 MG/DL (ref 8.3–10.6)
CHLORIDE BLD-SCNC: 106 MMOL/L (ref 99–110)
CO2: 20 MMOL/L (ref 21–32)
CREAT SERPL-MCNC: 1.7 MG/DL (ref 0.6–1.2)
GFR AFRICAN AMERICAN: 35
GFR NON-AFRICAN AMERICAN: 29
GLUCOSE BLD-MCNC: 113 MG/DL (ref 70–99)
GLUCOSE BLD-MCNC: 120 MG/DL (ref 70–99)
GLUCOSE BLD-MCNC: 124 MG/DL (ref 70–99)
GLUCOSE BLD-MCNC: 158 MG/DL (ref 70–99)
GLUCOSE BLD-MCNC: 96 MG/DL (ref 70–99)
HCT VFR BLD CALC: 23.5 % (ref 36–48)
HEMOGLOBIN: 7.9 G/DL (ref 12–16)
MCH RBC QN AUTO: 30.4 PG (ref 26–34)
MCHC RBC AUTO-ENTMCNC: 33.7 G/DL (ref 31–36)
MCV RBC AUTO: 90.2 FL (ref 80–100)
PDW BLD-RTO: 14.6 % (ref 12.4–15.4)
PERFORMED ON: ABNORMAL
PERFORMED ON: NORMAL
PLATELET # BLD: 173 K/UL (ref 135–450)
PMV BLD AUTO: 9.4 FL (ref 5–10.5)
POTASSIUM SERPL-SCNC: 5 MMOL/L (ref 3.5–5.1)
RBC # BLD: 2.6 M/UL (ref 4–5.2)
SODIUM BLD-SCNC: 136 MMOL/L (ref 136–145)
WBC # BLD: 16.8 K/UL (ref 4–11)

## 2020-11-05 PROCEDURE — 97535 SELF CARE MNGMENT TRAINING: CPT

## 2020-11-05 PROCEDURE — 97166 OT EVAL MOD COMPLEX 45 MIN: CPT

## 2020-11-05 PROCEDURE — 6370000000 HC RX 637 (ALT 250 FOR IP): Performed by: UROLOGY

## 2020-11-05 PROCEDURE — 94760 N-INVAS EAR/PLS OXIMETRY 1: CPT

## 2020-11-05 PROCEDURE — 85027 COMPLETE CBC AUTOMATED: CPT

## 2020-11-05 PROCEDURE — 97116 GAIT TRAINING THERAPY: CPT

## 2020-11-05 PROCEDURE — 97530 THERAPEUTIC ACTIVITIES: CPT

## 2020-11-05 PROCEDURE — 6360000002 HC RX W HCPCS: Performed by: UROLOGY

## 2020-11-05 PROCEDURE — 1200000000 HC SEMI PRIVATE

## 2020-11-05 PROCEDURE — 2580000003 HC RX 258: Performed by: UROLOGY

## 2020-11-05 PROCEDURE — 97161 PT EVAL LOW COMPLEX 20 MIN: CPT

## 2020-11-05 PROCEDURE — 36415 COLL VENOUS BLD VENIPUNCTURE: CPT

## 2020-11-05 PROCEDURE — 80048 BASIC METABOLIC PNL TOTAL CA: CPT

## 2020-11-05 RX ORDER — FERROUS SULFATE 325(65) MG
325 TABLET ORAL
COMMUNITY

## 2020-11-05 RX ADMIN — OXYCODONE HYDROCHLORIDE 10 MG: 10 TABLET ORAL at 16:26

## 2020-11-05 RX ADMIN — INSULIN LISPRO 1 UNITS: 100 INJECTION, SOLUTION INTRAVENOUS; SUBCUTANEOUS at 14:13

## 2020-11-05 RX ADMIN — PREGABALIN 150 MG: 75 CAPSULE ORAL at 14:13

## 2020-11-05 RX ADMIN — SODIUM CHLORIDE: 9 INJECTION, SOLUTION INTRAVENOUS at 03:28

## 2020-11-05 RX ADMIN — SODIUM CHLORIDE: 9 INJECTION, SOLUTION INTRAVENOUS at 16:31

## 2020-11-05 RX ADMIN — OXYCODONE HYDROCHLORIDE 10 MG: 10 TABLET ORAL at 06:09

## 2020-11-05 RX ADMIN — PREGABALIN 150 MG: 75 CAPSULE ORAL at 08:45

## 2020-11-05 RX ADMIN — BISACODYL 5 MG: 5 TABLET, COATED ORAL at 08:45

## 2020-11-05 RX ADMIN — MORPHINE SULFATE 10 MG: 4 INJECTION, SOLUTION INTRAMUSCULAR; INTRAVENOUS at 18:58

## 2020-11-05 RX ADMIN — Medication 10 ML: at 08:46

## 2020-11-05 RX ADMIN — FLUTICASONE PROPIONATE 2 SPRAY: 50 SPRAY, METERED NASAL at 08:46

## 2020-11-05 RX ADMIN — OXYCODONE HYDROCHLORIDE 10 MG: 10 TABLET ORAL at 12:26

## 2020-11-05 RX ADMIN — ENOXAPARIN SODIUM 30 MG: 30 INJECTION SUBCUTANEOUS at 08:45

## 2020-11-05 RX ADMIN — PANTOPRAZOLE SODIUM 40 MG: 40 TABLET, DELAYED RELEASE ORAL at 06:09

## 2020-11-05 RX ADMIN — PANTOPRAZOLE SODIUM 40 MG: 40 TABLET, DELAYED RELEASE ORAL at 16:27

## 2020-11-05 ASSESSMENT — PAIN DESCRIPTION - LOCATION
LOCATION: FLANK

## 2020-11-05 ASSESSMENT — PAIN DESCRIPTION - DESCRIPTORS
DESCRIPTORS: ACHING;CRAMPING

## 2020-11-05 ASSESSMENT — PAIN DESCRIPTION - FREQUENCY
FREQUENCY: CONTINUOUS

## 2020-11-05 ASSESSMENT — PAIN SCALES - GENERAL
PAINLEVEL_OUTOF10: 4
PAINLEVEL_OUTOF10: 0
PAINLEVEL_OUTOF10: 8
PAINLEVEL_OUTOF10: 4
PAINLEVEL_OUTOF10: 6
PAINLEVEL_OUTOF10: 4
PAINLEVEL_OUTOF10: 0
PAINLEVEL_OUTOF10: 6
PAINLEVEL_OUTOF10: 4

## 2020-11-05 ASSESSMENT — PAIN - FUNCTIONAL ASSESSMENT
PAIN_FUNCTIONAL_ASSESSMENT: PREVENTS OR INTERFERES SOME ACTIVE ACTIVITIES AND ADLS

## 2020-11-05 ASSESSMENT — PAIN DESCRIPTION - PROGRESSION
CLINICAL_PROGRESSION: GRADUALLY IMPROVING
CLINICAL_PROGRESSION: NOT CHANGED
CLINICAL_PROGRESSION: GRADUALLY IMPROVING

## 2020-11-05 ASSESSMENT — PAIN DESCRIPTION - ORIENTATION
ORIENTATION: LEFT

## 2020-11-05 ASSESSMENT — PAIN DESCRIPTION - ONSET
ONSET: ON-GOING

## 2020-11-05 ASSESSMENT — PAIN DESCRIPTION - PAIN TYPE
TYPE: SURGICAL PAIN

## 2020-11-05 NOTE — PLAN OF CARE
Problem: Falls - Risk of:  Goal: Will remain free from falls  Description: Will remain free from falls  11/5/2020 0803 by Pamella Rocha RN  Outcome: Ongoing  Note: Will remain free from falls. Electronically signed by Pamella Rocha RN on 11/5/2020 at 8:04 AM    11/4/2020 1856 by Quang Cobb RN  Outcome: Ongoing  Note: Fall risk assessment completed. Fall precautions in place. Call light within reach. Pt educated on calling for assistance before getting up. Walkway free of clutter. Will continue to monitor. Goal: Absence of physical injury  Description: Absence of physical injury  11/5/2020 0803 by Pamella Rocha RN  Outcome: Ongoing  Note: Absence of physical injury. Electronically signed by Pamella Rocha RN on 11/5/2020 at 8:04 AM    11/4/2020 1856 by Quang Cobb RN  Outcome: Ongoing     Problem: Skin Integrity:  Goal: Will show no infection signs and symptoms  Description: Will show no infection signs and symptoms  11/5/2020 0803 by Pamella Rocha RN  Outcome: Ongoing  Note: Will show no signs/symptoms infection. Electronically signed by Pamella Rocha RN on 11/5/2020 at 8:04 AM    11/4/2020 1856 by Quang Cobb RN  Outcome: Ongoing  Note: Will monitor for infection. Goal: Absence of new skin breakdown  Description: Absence of new skin breakdown  11/5/2020 0803 by Pamella Rocha RN  Outcome: Ongoing  Note: Absence of new skin breakdown. Electronically signed by Pamella Rocha RN on 11/5/2020 at 8:04 AM    11/4/2020 1856 by Quang Cobb RN  Outcome: Ongoing     Problem: Pain:  Goal: Pain level will decrease  Description: Pain level will decrease  11/5/2020 0803 by Pamella Rocha RN  Outcome: Ongoing  Note: Pain level will decrease. Electronically signed by Pamella Rocha RN on 11/5/2020 at 8:05 AM    11/4/2020 1856 by Quang Cobb RN  Outcome: Ongoing  Note: Pt assessed for pain. Pt in pain and assessed with 0-10 pain rating scale. Pt given prescribed analgesic for pain.  (See eMar) Pt satisfied with pain relief thus far. Will reassess and continue to monitor. Goal: Control of acute pain  Description: Control of acute pain  11/5/2020 0803 by Karo Matthews RN  Outcome: Ongoing  Note: Control acute pain. Electronically signed by Karo Matthews RN on 11/5/2020 at 8:05 AM    11/4/2020 1856 by Rainer Mcdaniel RN  Outcome: Ongoing  Goal: Control of chronic pain  Description: Control of chronic pain  11/5/2020 0803 by Karo Matthews RN  Outcome: Ongoing  Note: Control of chronic pain.   Electronically signed by Karo Matthews RN on 11/5/2020 at 8:05 AM    11/4/2020 1856 by Rainer Mcdaniel RN  Outcome: Ongoing

## 2020-11-05 NOTE — PROGRESS NOTES
Abelino lynch'd at this time, pt tolerated well, 350ml emptied from bag.   Electronically signed by Nadya Cadena RN on 11/5/2020 at 9:06 AM

## 2020-11-05 NOTE — PROGRESS NOTES
Medication Reconciliation    List of medications patient is currently taking is complete. Source of information: 1. Conversation with patient at bedside                                           Patient provided med list                                      2. EPIC records      Allergies  Patient has no known allergies. Notes regarding home medications:   1. Lyrica current dose is 150 mg bid  2. Diltiazem current dose is 30 mg tid  3.  Added Vitamin B-6 and Ferrous sulfate

## 2020-11-05 NOTE — PROGRESS NOTES
open partial nephrectomy with Dr. Yuki Whiting 11/3/2020  PLAN   1. Discontinue Roca catheter  2. Pain control  3. IS use. 4. OOB to chair and ambulate as tolerated x 3 today  5. Advance diet today   6. Discharge home likely tomorrow.      GAGE Diaz - CNP 11/5/2020 7:47 AM

## 2020-11-05 NOTE — PROGRESS NOTES
Physical Therapy    Facility/Department: [address] ORTHOPEDICS  Initial Assessment/Treatment Session    NAME: Josephine Dias  : 1940  MRN: [id number]    Date of Service: 2020    Discharge Recommendations:  Patient would benefit from continued therapy after discharge, 24 hour supervision or assist, Home with Home health PT, S Level 1   PT Equipment Recommendations  Equipment Needed: No    Assessment   Body structures, Functions, Activity limitations: Decreased functional mobility ; Decreased endurance; Increased pain;Decreased strength;Decreased balance;Decreased coordination;Decreased safe awareness  Assessment: Pt is an 78 y.o. F. admitted 11/3 for neoplasm, underwent partial L nephrectomy. She presents in pain, somewhat confused (baseline), and with mildly decreased strength in LEs. Today she required Min A for transfers, and MIn A to correct 1 LOB during ambulation with walker. She would benefit from continued therapy to improve her strength, balance, endurance, and independence ambulating. Anticipate safe return home with  assist from her daughter, regular use of RW, and home PT level 1. Josephine Dias scored a 18/24 on the AM-PAC short mobility form. Current research shows that an AM-PAC score of 18 or greater is typically associated with a discharge to the patient's home setting. Based on the patient's AM-PAC score and their current functional mobility deficits, it is recommended that the patient have 2-3 sessions per week of Physical Therapy at d/c to increase the patient's independence. At this time, this patient demonstrates the endurance and safety to discharge home with home PT and a follow up treatment frequency of 2-3x/wk. Please see assessment section for further patient specific details. If patient discharges prior to next session this note will serve as a discharge summary. Please see below for the latest assessment towards goals.      Specific instructions for Next Treatment: Improve strength, balance, endurance  Prognosis: Good  Decision Making: Low Complexity  History: See below  Exam: Strength; ROM; Balance; Ambulation  Clinical Presentation: Stable  PT Education: Goals; General Safety;Gait Training;Orientation;Plan of Care;PT Role;Home Exercise Program  Barriers to Learning: Pain; Confusion  REQUIRES PT FOLLOW UP: Yes  Activity Tolerance  Activity Tolerance: Patient limited by pain       Patient Diagnosis(es): The primary encounter diagnosis was Encounter for preadmission testing. A diagnosis of Neoplasm of uncertain behavior of left kidney was also pertinent to this visit. has a past medical history of Atrial fibrillation ([facility] Diabetes mellitus ([facility] Hyperlipidemia, Hypertension, Neuropathy, Pacemaker, and Renal cancer ([facility] has a past surgical history that includes Tonsillectomy; Cholecystectomy; pacemaker placement; Colonoscopy; and partial nephrectomy (Left, 11/3/2020). Restrictions  Restrictions/Precautions  Restrictions/Precautions: Fall Risk  Position Activity Restriction  Other position/activity restrictions: L SANDY drain     Vision/Hearing  Vision: Impaired  Vision Exceptions: Wears glasses for reading  Hearing: Exceptions to [facility]  Hearing Exceptions: Hard of hearing/hearing concerns       Subjective  General  Additional Pertinent Hx: Neoplasm of Left kidney. Response To Previous Treatment: Not applicable  Family / Caregiver Present: Yes  Referring Practitioner: Dr. Richardson Felipe  Referral Date : 11/05/20  Diagnosis: Partial L nephrectomy  Follows Commands: Within Functional Limits  Subjective  Subjective: Pt reports significant pain. Daughter present.     Orientation  Orientation  Overall Orientation Status: Within Functional Limits     Social/Functional History  Social/Functional History  Lives With: Daughter  Type of Home: House  Home Layout: One level  Home Access: Stairs to enter with rails  Entrance Stairs - Number of Steps: 2-3 SHANE with HR  Bathroom Shower/Tub: Walk-in shower  Bathroom Toilet: Handicap height  Bathroom Equipment: Grab bars in shower, Shower chair, Grab bars around toilet  Home Equipment: Cane, Rolling walker(Tri-cane)  ADL Assistance: Independent  Homemaking Assistance: (Pt helps around the kitchen, and folds laundry)  Ambulation Assistance: Independent(Uses cane in community, no device at home)  Transfer Assistance: Independent  Active : No  Mode of Transportation: Car  Occupation: Retired  Leisure & Hobbies: reads, watch TV, clean kitchen, fold laundry, plays with 15 year old grandson, great grandsons  Additional Comments: Denies hx of recent falls    Objective    AROM RLE (degrees)  RLE AROM: WFL  RLE General AROM: Hip Flex, Knee Flex/ext, and ANkle Pf/DF WFL  AROM LLE (degrees)  LLE AROM : WFL  LLE General AROM: Hip Flex, Knee Flex/ext, and ANkle Pf/DF WFL  AROM RUE (degrees)  RUE AROM : WFL  RUE General AROM: Shoulder Flex, Elbow Flex/Ext WFL  AROM LUE (degrees)  LUE AROM : WFL  LUE General AROM: Shoulder Flex, Elbow Flex/Ext WFL     Strength RLE  Strength RLE: WFL  Comment: hip Flex, Knee Flex/Ext, and ankle PF/DF grossly 3+/5  Strength LLE  Strength LLE: WFL  Comment: hip Flex, Knee Flex/Ext, and ankle PF/DF grossly 3+/5  Strength RUE  Strength RUE: WFL  Comment: Shoulder Flex, Elbow Flex/Ext grossly 3+/5  Strength LUE  Strength LUE: WFL  Comment: Shoulder Flex, Elbow Flex/Ext grossly 3+/5     Tone RLE  RLE Tone: Normotonic  Tone LLE  LLE Tone: Normotonic  Motor Control  Gross Motor?: WFL     Transfers  Sit to Stand: Minimal Assistance  Stand to sit: Minimal Assistance     Ambulation  Ambulation?: Yes  Ambulation 1  Surface: level tile  Device: Rolling Walker  Assistance: Contact guard assistance;Minimal assistance  Quality of Gait: Pt ambulated slowly with walker, moving with step-through pattern, experiencing 1 mild LOB d/t bout of pain, requiring MIn A to correct.   Distance: 20' x 2      Plan   Plan  Times per week: 2-3x  Specific instructions for Next Treatment: Improve strength, balance, endurance  Safety Devices  Type of devices: All fall risk precautions in place, Call light within reach, Chair alarm in place, Gait belt, Left in chair, Nurse notified  Restraints  Initially in place: No      AM-PAC Score  AM-PAC Inpatient Mobility Raw Score : 18 (11/05/20 1638)  AM-PAC Inpatient T-Scale Score : 43.63 (11/05/20 1638)  Mobility Inpatient CMS 0-100% Score: 46.58 (11/05/20 1638)  Mobility Inpatient CMS G-Code Modifier : CK (11/05/20 1638)          Goals  Short term goals  Time Frame for Short term goals: In 2-3 days pt will perform  Short term goal 1: Bed mobility CGA  Short term goal 2: Transfers CGA  Short term goal 3: Ambulation 48' with RW, CGA  Short term goal 4: Ascend/Descend 3 steps with CGA  Long term goals  Time Frame for Long term goals : LTG = STG  Patient Goals   Patient goals : To return directly home       Therapy Time   Individual Concurrent Group Co-treatment   Time In 1255         Time Out 1340         Minutes 45         Timed Code Treatment Minutes: 30 Minutes   Evaluation time: 15 min.      Florencio Morales PT    Electronically signed by Florencio Morales PT [id number] on 11/5/2020 at 4:43 PM

## 2020-11-05 NOTE — PROGRESS NOTES
Patient A&O in the bed, lethargic but easily arousable. Patient tolerating PO intake well. PM medications given without complications. Patient denies nausea or vomiting. Patient complains of pain, PRN pain medication given (see MAR). Dressing to Left side of abdomen is clean, dry, intact, ice applied. Bulb suction in place. Call light within reach, able to make needs known, fall precautions in place. Will continue to check on patient Q2 hours.     Electronically signed by Yash Mckeon RN on 11/5/2020 at 12:28 AM

## 2020-11-05 NOTE — PLAN OF CARE
satisfied with pain relief thus far. Will reassess and continue to monitor. 11/5/2020 0803 by Chelsea Turner RN  Outcome: Ongoing  Note: Pain level will decrease. Electronically signed by Chelsea Turner RN on 11/5/2020 at 8:05 AM    Goal: Control of acute pain  Description: Control of acute pain  11/5/2020 1857 by Siri Wade RN  Outcome: Ongoing  11/5/2020 0803 by Chelsea Turner RN  Outcome: Ongoing  Note: Control acute pain. Electronically signed by Chelsea Turner RN on 11/5/2020 at 8:05 AM    Goal: Control of chronic pain  Description: Control of chronic pain  11/5/2020 1857 by Siri Wade RN  Outcome: Ongoing  11/5/2020 0803 by Chelsea Turner RN  Outcome: Ongoing  Note: Control of chronic pain.   Electronically signed by Chelsea Turner RN on 11/5/2020 at 8:05 AM

## 2020-11-05 NOTE — PROGRESS NOTES
11/3/2020). Restrictions  Restrictions/Precautions  Restrictions/Precautions: Fall Risk    Subjective   General  Chart Reviewed: Yes, Orders, Progress Notes, History and Physical  Additional Pertinent Hx: Neoplasm of Left kidney.   Family / Caregiver Present: Yes(dtr)  Referring Practitioner: Dr. Deyvi Johnson  Diagnosis: s/p left partial nephrectomy  Subjective  Subjective: Agreed to OT, seen in room, c/o pain \"4/10\" at surgical site    Social/Functional History  Social/Functional History  Lives With: Daughter  Type of Home: House  Home Layout: One level  Home Access: Stairs to enter with rails  Entrance Stairs - Number of Steps: 2-3 SHANE with HR  Bathroom Shower/Tub: Walk-in shower  Bathroom Toilet: Handicap height  Bathroom Equipment: Grab bars in shower, Shower chair, Grab bars around toilet  Home Equipment: Cane, Rolling walker(Tri-cane)  ADL Assistance: Independent  Homemaking Assistance: (Pt helps around the kitchen, and folds laundry)  Ambulation Assistance: Independent(Uses cane in community, no device at home)  Transfer Assistance: Independent  Active : No  Mode of Transportation: Car  Occupation: Retired  Leisure & Hobbies: reads, watch TV, clean kitchen, fold laundry, plays with 15 year old grandson, great grandsons  Additional Comments: Denies hx of recent falls       Objective   Vision: Impaired  Vision Exceptions: Wears glasses for reading  Hearing: Exceptions to [facility]  Hearing Exceptions: Hard of hearing/hearing concerns    Orientation  Overall Orientation Status: Within Functional Limits     Balance  Sitting Balance: Stand by assistance  Standing Balance: Contact guard assistance  Functional Mobility  Functional - Mobility Device: Rolling Walker  Activity: To/from bathroom  Assist Level: Contact guard assistance  Functional Mobility Comments: One LOB while turning to toilet, min A to recover  Toilet Transfers  Toilet - Technique: Ambulating  Equipment Used: Standard bedside commode  Toilet Transfer: Contact guard assistance  ADL  Feeding: Dentures  Toileting: Contact guard assistance(for stance)  Additional Comments: Pt with increased comlaints of pain with movement. Pt with episode of LOB, min A to recover  Tone RUE  RUE Tone: Normotonic  Tone LUE  LUE Tone: Normotonic  Coordination  Movements Are Fluid And Coordinated: Yes     Bed mobility  Supine to Sit: Unable to assess(up in chair at start of session)  Sit to Supine: Minimal assistance(assist for LEs)  Transfers  Sit to stand: Contact guard assistance  Stand to sit: Contact guard assistance     Cognition  Overall Cognitive Status: WFL  Cognition Comment: dtr reports mild intermittent confusion likely due to anesthesia- pt WFL  Perception  Overall Perceptual Status: WFL     Sensation  Overall Sensation Status: WFL        LUE AROM (degrees)  LUE AROM : WFL  RUE AROM (degrees)  RUE AROM : WFL  LUE Strength  Gross LUE Strength: WFL  RUE Strength  Gross RUE Strength: WFL                   Plan   Plan  Times per week: 3-5  Times per day: Daily  Plan weeks: 1  Current Treatment Recommendations: Strengthening, Patient/Caregiver Education & Training, Home Management Training, Equipment Evaluation, Education, & procurement, Balance Training, Functional Mobility Training, Endurance Training, Safety Education & Training, Self-Care / ADL         OutComes Score    Damian Ramirez scored a 19/24 on the AM-PAC ADL Inpatient form. Current research shows that an AM-PAC score of 18 or greater is typically associated with a discharge to the patient's home setting. Based on the patient's AM-PAC score, and their current ADL deficits, it is recommended that the patient have 2-3 sessions per week of Occupational Therapy at d/c to increase the patient's independence. At this time, this patient demonstrates the endurance and safety to discharge home with home services and a follow up treatment frequency of 2-3x/wk.    Please see assessment section for further patient specific details. If patient discharges prior to next session this note will serve as a discharge summary. Please see below for the latest assessment towards goals.                                                   AM-PAC Score        AM-PAC Inpatient Daily Activity Raw Score: 19 (11/05/20 1542)  AM-PAC Inpatient ADL T-Scale Score : 40.22 (11/05/20 1542)  ADL Inpatient CMS 0-100% Score: 42.8 (11/05/20 1542)  ADL Inpatient CMS G-Code Modifier : CK (11/05/20 1542)    Goals  Short term goals  Time Frame for Short term goals: 1 week  Short term goal 1: Pt will be mod I with functional transfers  Short term goal 2: Pt will be mod I with functional mobility  Short term goal 3: Pt will be mod I with toileting  Short term goal 4: Pt will be mod I with bathing and dressing  Short term goal 5: Pt will be mod I with bed mobility  Long term goals  Time Frame for Long term goals : STG = LTG  Patient Goals   Patient goals : Pt wants to be home with daughter       Therapy Time   Individual Concurrent Group Co-treatment   Time In 1430         Time Out 1530         Minutes 60         Timed Code Treatment Minutes: Josie Do 48, OT

## 2020-11-05 NOTE — PROGRESS NOTES
Pt resting in bed, rate left flank pain 4/10 after having pain med earlier this morning, ice packs on left flank, SANDY drain intact and has bloody drainage, ivf infusing w/o difficulty, call light in reach.   Electronically signed by Bon Adam RN on 11/5/2020 at 8:59 AM

## 2020-11-05 NOTE — CARE COORDINATION
INITIAL CASE MANAGEMENT ASSESSMENT    Reviewed chart, met with patient and daughter Mega Ambrosio ([phone]) to assess possible discharge needs. Explained Case Management role/services. Living Situation: Patient lives with daughter and her family in a one story home with 3 steps to enter with rail. ADLs: independent; helps with housework; daughter does cooking     DME: uses a cane when out of the house, RTS, shower chair with back; has access to RW    PT/OT Recs: home care     Active Services: none     Transportation: daughter transports     Medications: take on her own; [facility] on [address]    PCP: April Guerrier MD at [facility]      PLAN/COMMENTS: Plan is to return to home (lives with daughter) and have home care. Sw provided home care list. They prefer [address] Denied other needs at the present time. Sw made referral to [facility] liaison. The Plan for Transition of Care is related to the following treatment goals: home with home care  The Patient and/or patient representative daughter, Mega Ambrosio, was provided with a choice of provider and agrees   with the discharge plan. [x] Yes [] No    Freedom of choice list was provided with basic dialogue that supports the patient's individualized plan of care/goals, treatment preferences and shares the quality data associated with the providers. [x] Yes [] No    SW/CM provided contact information for patient or family to call with any questions. SW/CM will follow and assist as needed.     Electronically signed by Wero Epperson 11/5/2020 at 4:43 PM

## 2020-11-05 NOTE — DISCHARGE INSTR - COC
Continuity of Care Form    Patient Name: Calli Orozco   :  1940  MRN:  [id number]    Admit date:  11/3/2020  Discharge date:  ***    Code Status Order: Full Code   Advance Directives:   [facility] Documentation       Date/Time Healthcare Directive Type of Healthcare Directive Copy in [address] Agent's Name Healthcare Agent's Phone Number    20 1300 [employer]  --  --  --  Adult [facility]  [phone]    20 1844  Yes, patient has an advance directive for healthcare treatment  Living will;Durable power of  for health care  No, copy requested from family  Adult Children  Liban Knock  --    20 1147  Yes, patient has an advance directive for healthcare treatment POA here pt's daughter  --  No, copy requested from family  --  --  --    10/30/20 1210  Yes, patient has an advance directive for healthcare treatment  Living will;Durable power of  for health care  --  Adult Children  daughter-purnima  [phone]            Admitting Physician:  Rashida Wu MD  PCP: Surjit Becerril MD    Discharging Nurse: [facility] Unit/Room#: B3N-4572/3110-01  Discharging Unit Phone Number: ***    Emergency Contact:   Extended Emergency Contact Information  Primary Emergency Contact: [address], [address] [facility] Phone: [phone]  Mobile Phone: [phone]  Relation: Child  Preferred language: English   needed? No    Past Surgical History:  Past Surgical History:   Procedure Laterality Date    CHOLECYSTECTOMY      COLONOSCOPY      PACEMAKER PLACEMENT      PARTIAL NEPHRECTOMY Left 11/3/2020    LEFT OPEN PARTIAL NEPHRECTOMY performed by Rashida Wu MD at [address] [place] [address]         Immunization History: There is no immunization history on file for this patient.     Active Problems:  Patient Active Problem List   Diagnosis Code    [facility], [facility] laterality [facility] C64.9 Isolation/Infection:   Isolation            No Isolation          Patient Infection Status       None to display            Nurse Assessment:  Last Vital Signs: /65   Pulse 88   Temp 98.2 °F (36.8 °C) (Oral)   Resp 20   Ht 5' 5\" (1.651 m)   Wt 183 lb 6.8 oz (83.2 kg)   SpO2 90%   BMI 30.52 kg/m²     Last documented pain score (0-10 scale): Pain Level: 4  Last Weight:   Wt Readings from Last 1 Encounters:   11/04/20 183 lb 6.8 oz (83.2 kg)     Mental Status:  {IP PT MENTAL STATUS:20030}    IV Access:  { POLI IV ACCESS:481871791}    Nursing Mobility/ADLs:  Walking   {CHP DME AQET:096527839}  Transfer  {CHP DME AFCQ:765044235}  Bathing  {CHP DME ZHPS:216712719}  Dressing  {CHP DME YMOM:399097694}  Toileting  {CHP DME ANWQ:159175138}  Feeding  {CHP DME YEQE:042964152}  Med Admin  {P DME JMGT:512255665}  Med Delivery   { POLI MED Delivery:771628575}    Wound Care Documentation and Therapy:        Elimination:  Continence:   · Bowel: {YES / TW:46963}  · Bladder: {YES / AU:38108}  Urinary Catheter: {Urinary Catheter:476496800}   Colostomy/Ileostomy/Ileal Conduit: {YES / WA:79598}       Date of Last BM: ***    Intake/Output Summary (Last 24 hours) at 11/5/2020 1643  Last data filed at 11/5/2020 1629  Gross per 24 hour   Intake 2227 ml   Output 2005 ml   Net 222 ml     I/O last 3 completed shifts:   In: 600 [P.O.:600]  Out: 1980 [Urine:1850; Drains:130]    Safety Concerns:     [address] [employer] Safety Concerns:849614643}    Impairments/Disabilities:      [address] [employer] Impairments/Disabilities:504048840}    Nutrition Therapy:  Current Nutrition Therapy:   [employer] Diet List:240174171}    Routes of Feeding: {CHP DME Other Feedings:396099948}  Liquids: {Slp liquid thickness:28098}  Daily Fluid Restriction: {CHP DME Yes amt example:897328990}  Last Modified Barium Swallow with Video (Video Swallowing Test): {Done Not Done OKLR:345961555}    Treatments at the Time of Hospital Discharge:   Respiratory Treatments: ***  Oxygen Update Admission H&P: Changes in H&P as follows - Left open partial nephrectomy with staples to incision    PHYSICIAN SIGNATURE:  {Esignature:700353666}

## 2020-11-06 LAB
ANION GAP SERPL CALCULATED.3IONS-SCNC: 12 MMOL/L (ref 3–16)
BUN BLDV-MCNC: 24 MG/DL (ref 7–20)
CALCIUM SERPL-MCNC: 8.1 MG/DL (ref 8.3–10.6)
CHLORIDE BLD-SCNC: 103 MMOL/L (ref 99–110)
CO2: 20 MMOL/L (ref 21–32)
CREAT SERPL-MCNC: 1.7 MG/DL (ref 0.6–1.2)
CREATININE FLUID: 1.6 MG/DL
FLUID TYPE: NORMAL
GFR AFRICAN AMERICAN: 35
GFR NON-AFRICAN AMERICAN: 29
GLUCOSE BLD-MCNC: 111 MG/DL (ref 70–99)
GLUCOSE BLD-MCNC: 117 MG/DL (ref 70–99)
GLUCOSE BLD-MCNC: 129 MG/DL (ref 70–99)
GLUCOSE BLD-MCNC: 130 MG/DL (ref 70–99)
GLUCOSE BLD-MCNC: 96 MG/DL (ref 70–99)
HCT VFR BLD CALC: 23.1 % (ref 36–48)
HEMOGLOBIN: 7.7 G/DL (ref 12–16)
MCH RBC QN AUTO: 30 PG (ref 26–34)
MCHC RBC AUTO-ENTMCNC: 33.2 G/DL (ref 31–36)
MCV RBC AUTO: 90.5 FL (ref 80–100)
PDW BLD-RTO: 14.3 % (ref 12.4–15.4)
PERFORMED ON: ABNORMAL
PERFORMED ON: NORMAL
PLATELET # BLD: 195 K/UL (ref 135–450)
PMV BLD AUTO: 9.1 FL (ref 5–10.5)
POTASSIUM SERPL-SCNC: 4 MMOL/L (ref 3.5–5.1)
RBC # BLD: 2.56 M/UL (ref 4–5.2)
SODIUM BLD-SCNC: 135 MMOL/L (ref 136–145)
WBC # BLD: 14 K/UL (ref 4–11)

## 2020-11-06 PROCEDURE — 6360000002 HC RX W HCPCS: Performed by: UROLOGY

## 2020-11-06 PROCEDURE — 94760 N-INVAS EAR/PLS OXIMETRY 1: CPT

## 2020-11-06 PROCEDURE — 80048 BASIC METABOLIC PNL TOTAL CA: CPT

## 2020-11-06 PROCEDURE — 1200000000 HC SEMI PRIVATE

## 2020-11-06 PROCEDURE — 2580000003 HC RX 258: Performed by: UROLOGY

## 2020-11-06 PROCEDURE — 97530 THERAPEUTIC ACTIVITIES: CPT

## 2020-11-06 PROCEDURE — 85027 COMPLETE CBC AUTOMATED: CPT

## 2020-11-06 PROCEDURE — 6360000002 HC RX W HCPCS: Performed by: NURSE PRACTITIONER

## 2020-11-06 PROCEDURE — 82570 ASSAY OF URINE CREATININE: CPT

## 2020-11-06 PROCEDURE — 97535 SELF CARE MNGMENT TRAINING: CPT

## 2020-11-06 PROCEDURE — 6370000000 HC RX 637 (ALT 250 FOR IP): Performed by: UROLOGY

## 2020-11-06 PROCEDURE — 36415 COLL VENOUS BLD VENIPUNCTURE: CPT

## 2020-11-06 PROCEDURE — 97116 GAIT TRAINING THERAPY: CPT

## 2020-11-06 RX ORDER — PREGABALIN 75 MG/1
150 CAPSULE ORAL 2 TIMES DAILY
Status: DISCONTINUED | OUTPATIENT
Start: 2020-11-06 | End: 2020-11-10 | Stop reason: HOSPADM

## 2020-11-06 RX ORDER — MORPHINE SULFATE 2 MG/ML
2 INJECTION, SOLUTION INTRAMUSCULAR; INTRAVENOUS EVERY 4 HOURS PRN
Status: DISCONTINUED | OUTPATIENT
Start: 2020-11-06 | End: 2020-11-10 | Stop reason: HOSPADM

## 2020-11-06 RX ADMIN — OXYCODONE HYDROCHLORIDE 10 MG: 10 TABLET ORAL at 00:32

## 2020-11-06 RX ADMIN — ENOXAPARIN SODIUM 30 MG: 30 INJECTION SUBCUTANEOUS at 08:35

## 2020-11-06 RX ADMIN — OXYCODONE HYDROCHLORIDE 10 MG: 10 TABLET ORAL at 22:32

## 2020-11-06 RX ADMIN — FLUTICASONE PROPIONATE 2 SPRAY: 50 SPRAY, METERED NASAL at 08:35

## 2020-11-06 RX ADMIN — OXYCODONE HYDROCHLORIDE 10 MG: 10 TABLET ORAL at 13:07

## 2020-11-06 RX ADMIN — MORPHINE SULFATE 2 MG: 2 INJECTION, SOLUTION INTRAMUSCULAR; INTRAVENOUS at 20:17

## 2020-11-06 RX ADMIN — PREGABALIN 150 MG: 75 CAPSULE ORAL at 08:34

## 2020-11-06 RX ADMIN — SODIUM CHLORIDE: 9 INJECTION, SOLUTION INTRAVENOUS at 10:29

## 2020-11-06 RX ADMIN — PREGABALIN 150 MG: 75 CAPSULE ORAL at 22:31

## 2020-11-06 RX ADMIN — OXYCODONE HYDROCHLORIDE 10 MG: 10 TABLET ORAL at 17:08

## 2020-11-06 RX ADMIN — NORTRIPTYLINE HYDROCHLORIDE 10 MG: 10 CAPSULE ORAL at 22:31

## 2020-11-06 RX ADMIN — PANTOPRAZOLE SODIUM 40 MG: 40 TABLET, DELAYED RELEASE ORAL at 06:06

## 2020-11-06 RX ADMIN — OXYCODONE HYDROCHLORIDE 10 MG: 10 TABLET ORAL at 08:45

## 2020-11-06 RX ADMIN — SODIUM CHLORIDE: 9 INJECTION, SOLUTION INTRAVENOUS at 18:34

## 2020-11-06 RX ADMIN — BISACODYL 5 MG: 5 TABLET, COATED ORAL at 08:34

## 2020-11-06 RX ADMIN — PANTOPRAZOLE SODIUM 40 MG: 40 TABLET, DELAYED RELEASE ORAL at 17:08

## 2020-11-06 RX ADMIN — OXYCODONE HYDROCHLORIDE 10 MG: 10 TABLET ORAL at 04:45

## 2020-11-06 ASSESSMENT — PAIN - FUNCTIONAL ASSESSMENT
PAIN_FUNCTIONAL_ASSESSMENT: PREVENTS OR INTERFERES SOME ACTIVE ACTIVITIES AND ADLS

## 2020-11-06 ASSESSMENT — PAIN DESCRIPTION - FREQUENCY
FREQUENCY: CONTINUOUS

## 2020-11-06 ASSESSMENT — PAIN DESCRIPTION - PROGRESSION
CLINICAL_PROGRESSION: GRADUALLY WORSENING
CLINICAL_PROGRESSION: GRADUALLY IMPROVING
CLINICAL_PROGRESSION: NOT CHANGED
CLINICAL_PROGRESSION: GRADUALLY WORSENING
CLINICAL_PROGRESSION: NOT CHANGED

## 2020-11-06 ASSESSMENT — PAIN DESCRIPTION - PAIN TYPE
TYPE: SURGICAL PAIN
TYPE: ACUTE PAIN;SURGICAL PAIN
TYPE: SURGICAL PAIN
TYPE: ACUTE PAIN;SURGICAL PAIN

## 2020-11-06 ASSESSMENT — PAIN DESCRIPTION - LOCATION
LOCATION: FLANK

## 2020-11-06 ASSESSMENT — PAIN SCALES - GENERAL
PAINLEVEL_OUTOF10: 0
PAINLEVEL_OUTOF10: 0
PAINLEVEL_OUTOF10: 2
PAINLEVEL_OUTOF10: 6
PAINLEVEL_OUTOF10: 6
PAINLEVEL_OUTOF10: 2
PAINLEVEL_OUTOF10: 2
PAINLEVEL_OUTOF10: 0
PAINLEVEL_OUTOF10: 6
PAINLEVEL_OUTOF10: 6
PAINLEVEL_OUTOF10: 10
PAINLEVEL_OUTOF10: 6
PAINLEVEL_OUTOF10: 5

## 2020-11-06 ASSESSMENT — PAIN DESCRIPTION - ORIENTATION
ORIENTATION: LEFT

## 2020-11-06 ASSESSMENT — PAIN DESCRIPTION - ONSET
ONSET: ON-GOING

## 2020-11-06 ASSESSMENT — PAIN DESCRIPTION - DESCRIPTORS
DESCRIPTORS: ACHING;CRAMPING

## 2020-11-06 NOTE — PROGRESS NOTES
Using commode, pain continues to gradually improve. Ambulated with PT/OT earlier today. Spontaneously voiding without difficulty, trying to have BM. Tolerating solids, appetite Improving. Likely discharge tomorrow.

## 2020-11-06 NOTE — PROGRESS NOTES
Patient in bed, she is eating and drinking better than this morning. Dressing to the left flank is intact, clean, and dry, ice applied. She is passing gas but has not had a BM yet. Patient reports that not having a BM for 3-4 days is normal for her. She is up with standby assist and a walker. Call light in reach, bed alarm set.  Will monitor

## 2020-11-06 NOTE — PROGRESS NOTES
Patient A&O in the bed, patient is lethargic but easily arousable. This RN attempted to give evening medications but patient refused as she stated she is too tired at this time. Patient denies pain, nausea or vomiting. SANDY drain in place. Call light within reach, able to make needs known, fall socks on, bed alarm on, bed in lowest position. Will check on patient Q2 hours.     Electronically signed by Guanakito Beyer RN on 11/5/2020 at 10:03 PM

## 2020-11-06 NOTE — PROGRESS NOTES
Occupational Therapy  Facility/Department: [address] ORTHOPEDICS  Daily Treatment Note  NAME: Duong Celeste  : 1940  MRN: [id number]    Date of Service: 2020    Discharge Recommendations:  Patient would benefit from continued therapy after discharge, 5-7 sessions per week, Continue to assess pending progress  OT Equipment Recommendations  Other: TBD  Duong Celeste scored a 17/24 on the AM-PAC ADL Inpatient form. Current research shows that an AM-PAC score of 17 or less is typically not associated with a discharge to the patient's home setting. Based on the patient's AM-PAC score and their current ADL deficits, it is recommended that the patient have 5-7 sessions per week of Occupational Therapy at d/c to increase the patient's independence. At this time, this patient demonstrates the endurance, and/or tolerance for 3 hours of therapy each day, with a treatment frequency of 5-7x/wk. Please see assessment section for further patient specific details. If patient discharges prior to next session this note will serve as a discharge summary. Please see below for the latest assessment towards goals. Assessment   Performance deficits / Impairments: Decreased functional mobility ; Decreased endurance;Decreased ADL status; Decreased balance;Decreased high-level IADLs  Assessment: pt making slow steady gains w/ OT intervention. Pt having 6/10 L flank pain and increased anxiety which limited her performance. Pt had to use toilet twice during session but was unsuccessful w/ BM. Pt's daughter was present to give encouragement. Pt able to ambulate from bed into bathrm and into recliner w/ CG/min A (also A of 1 to guide IV pole throughout). Pt given cues & min A when backing up & turning to sit onto toilet & recliner. She needed mod A for supine to sit on EOB; OT used priya pad, given pillow to hold against chest & L flank area during  movmt.  Pt is not IND w/ household transfers, toileting or bed mobility & is not safe to return home at this time; recommend continued OT services, would benefit from rehab unit once pt medically stable. Cont w/ POC  Treatment Diagnosis: impaired ADLs, t/f, fxl mob, strength  Prognosis: Good  Decision Making: Medium Complexity  History: see above  Exam: mobility, toileting, mobility  Assistance / Modification: min/CG assist of 1-2  OT Education: ADL Adaptive Strategies;Transfer Training  Patient Education: reviewed log roll for bed mob, clutching pillow on L side for support, safe hand placement during all t/f  Barriers to Learning: mild confusion/anxiety  REQUIRES OT FOLLOW UP: Yes  Activity Tolerance  Activity Tolerance: Patient limited by pain  Activity Tolerance: fair due to L flank pain  Safety Devices  Safety Devices in place: Yes  Type of devices: Call light within reach; Left in chair;Chair alarm in place;Gait belt         Patient Diagnosis(es): The primary encounter diagnosis was Encounter for preadmission testing. A diagnosis of Neoplasm of uncertain behavior of left kidney was also pertinent to this visit. has a past medical history of Atrial fibrillation ([facility] [address] Diabetes mellitus ([facility] [address] Hyperlipidemia, Hypertension, Neuropathy, Pacemaker, and Renal cancer ([facility] [address] has a past surgical history that includes Tonsillectomy; Cholecystectomy; pacemaker placement; Colonoscopy; and partial nephrectomy (Left, 11/3/2020). Restrictions  Restrictions/Precautions  Restrictions/Precautions: Fall Risk  Position Activity Restriction  Other position/activity restrictions: IVF R UE  Subjective   General  Chart Reviewed: Yes, Orders, Progress Notes, History and Physical  Patient assessed for rehabilitation services?: Yes  Additional Pertinent Hx: Neoplasm of Left kidney.   Family / Caregiver Present: Yes  Referring Practitioner: Dr. Amy Haney  Diagnosis: s/p left partial nephrectomy  Subjective  Subjective: met in room, pt trying to drink fluids, she is reporting 6/10 L flank pain, daughter present to encourage  General Comment  Comments: agreeable for OOB activity  Vital Signs  Patient Currently in Pain: Yes   Orientation  Orientation  Overall Orientation Status: Within Functional Limits  Objective    ADL  Feeding: Dentures;Setup(encouragement for fluids)  Toileting: Moderate assistance(needed A w/ clothing management & kevin care; participated w/ clothing management in front)  Additional Comments: Pt with increased L flank pain with movement. Given pillow to hold against this area while trying to have BM--unsuccessful        Balance  Sitting Balance: Stand by assistance(feels lightheaded while seated on EOB & toilet)  Standing Balance: Contact guard assistance  Standing Balance  Time: 2-3 minutes  Activity: during fxl mob, t/f, toileting  Comment: used support of RW, GB or BSC  Functional Mobility  Functional - Mobility Device: Rolling Walker  Activity: To/from bathroom  Assist Level: Contact guard assistance  Functional Mobility Comments: pt w/ weakness, feeling lightheaded, having L flank pain which impacted her mobility using RW, needed A to manage IV pole  Toilet Transfers  Toilet - Technique: Ambulating  Equipment Used: Standard bedside commode  Toilet Transfer: Contact guard assistance  Toilet Transfers Comments: BSC over toilet, also used GB  Wheelchair Bed Transfers  Wheelchair/Bed - Technique: Ambulating  Equipment Used: Bed;Other  Level of Asssistance: Contact guard assistance  Wheelchair Transfers Comments: CGA using RW for bed, toilet & recliner transfers, A of another to manage IV pole  Bed mobility  Supine to Sit: Moderate assistance  Scooting:  Moderate assistance  Comment: OT used priya pad for supine to sit & to scoot forward, guarding d/t L flank pain  Transfers  Sit to stand: Contact guard assistance  Stand to sit: Contact guard assistance  Transfer Comments: using RW, required guidance when turning & backing up to toilet & recliner                    Vision  Patient Visual Report: No visual complaint reported.   Vision Comment: reading glasses  Cognition  Overall Cognitive Status: WFL  Cognition Comment: dtr reports mild intermittent confusion likely due to anesthesia- pt WFL                                         Plan   Plan  Times per week: 3-5  Times per day: Daily  Plan weeks: 1-3 more day  Specific instructions for Next Treatment: co-tx in PM for further ambulation/OOB act  Current Treatment Recommendations: Strengthening, Patient/Caregiver Education & Training, Home Management Training, Equipment Evaluation, Education, & procurement, Balance Training, Functional Mobility Training, Endurance Training, Safety Education & Training, Self-Care / ADL, Pain Management  Plan Comment: recommend rehab         AM-PAC Score        AM-PAC Inpatient Daily Activity Raw Score: 17 (11/06/20 1509)  AM-PAC Inpatient ADL T-Scale Score : 37.26 (11/06/20 1509)  ADL Inpatient CMS 0-100% Score: 50.11 (11/06/20 1509)  ADL Inpatient CMS G-Code Modifier : CK (11/06/20 1509)    Goals  Short term goals  Time Frame for Short term goals: 1 week  Short term goal 1: Pt will be mod I with functional transfers  Short term goal 2: Pt will be mod I with functional mobility  Short term goal 3: Pt will be mod I with toileting  Short term goal 4: Pt will be mod I with bathing and dressing  Short term goal 5: Pt will be mod I with bed mobility  Long term goals  Time Frame for Long term goals : STG = LTG  Patient Goals   Patient goals : Pt wants to be home with daughter       Therapy Time   Individual Concurrent Group Co-treatment   Time In 1345         Time Out 1445         Minutes 60         Timed Code Treatment Minutes: [address], [place] [id number]

## 2020-11-06 NOTE — PROGRESS NOTES
Pt Name: Basilio Pittman Record Number: [id number]  Date of Birth 1940   Today's Date: 11/6/2020      Subjective:  Awake in chair, pain gradually improving. Tolerating clears - has not tried to eat solids yet, no nausea. + flatulence - patient reports BM usually q3days, voiding well. Using IS. Staples intact. SANDY drain intact and removed by me today    ROS: Constitutional: No fever    Vitals:  Vitals:    11/06/20 0437 11/06/20 0533 11/06/20 0800 11/06/20 0842   BP: (!) 157/72  118/67    Pulse: 91  101    Resp: 16  16    Temp: 98.5 °F (36.9 °C)  98.1 °F (36.7 °C)    TempSrc: Oral  Oral    SpO2: 95%   94%   Weight:  188 lb 0.8 oz (85.3 kg)     Height:         I/O last 3 completed shifts:   In: 2107 [P.O.:480; I.V.:1627]  Out: 475 [Urine:350; Drains:125]    Exam:  General: Awake, oriented, no acute distress  Respiratory: Nonlabored breathing  Abdomen: Soft, appropriately-tender, non-distended, no masses, staples intact, SANDY drain removed  : spontaneously voiding  Skin: Skin color, texture, turgor normal, no rashes or lesions  Neurologic: no gross deficits    CURRENT MEDICATIONS   Scheduled Meds:   pregabalin  150 mg Oral BID    fluticasone  2 spray Nasal Daily    nortriptyline  10 mg Oral Nightly    pantoprazole  40 mg Oral BID AC    sodium chloride flush  10 mL Intravenous 2 times per day    bisacodyl  5 mg Oral Daily    insulin lispro  0-6 Units Subcutaneous TID WC    insulin lispro  0-3 Units Subcutaneous Nightly    enoxaparin  30 mg Subcutaneous Daily     Continuous Infusions:   sodium chloride 125 mL/hr at 11/05/20 1631    dextrose       PRN Meds:.morphine, oxyCODONE, sodium chloride flush, promethazine **OR** ondansetron, glucose, dextrose, glucagon (rDNA), dextrose    LABS     Recent Labs     11/04/20  0525 11/05/20  1018 11/06/20  0444   WBC  --  16.8* 14.0*   HGB 8.6* 7.9* 7.7*   HCT 26.7* 23.5* 23.1*   PLT  --  173 195    136 135*   K 5.2* 5.0 4.0    106 103   CO2 17* 20* 20*   BUN 29* 25* 24*   CREATININE 1.7* 1.7* 1.7*   CALCIUM 7.9* 8.1* 8.1*     ASSESSMENT   1. Hospital day # 3  2. 80y. o. female s/p left open partial nephrectomy with Dr. Amy Haney 11/3/2020  PLAN   1. Pain control  2. IS use. 3. OOB to chair and ambulate as tolerated x 3 today  4. Advance diet today - encouraged solids  5. Discharge home maybe later today vs tomorrow. Will check on her again this afternoon.     Scooter Schaefer, GAGE - CNP 11/6/2020 8:59 AM

## 2020-11-06 NOTE — PLAN OF CARE
Problem: Falls - Risk of:  Goal: Will remain free from falls  Description: Will remain free from falls  Outcome: Met This Shift  Note: No falls noted this shift. Patient ambulates with x1 staff assist and a walker contact guard. Bed kept in low position. Safe environment maintained. Bedside table & call light in reach. Uses call light appropriately when needing assistance. Goal: Absence of physical injury  Description: Absence of physical injury  Outcome: Met This Shift  Note: No physical injury noted this shift. Patient has been assessed for fall risk, fall precautions are in place, environment has been cleared of obstacles and reassessed during rounding, bed is in lowest position with the wheels locked, call light is within reach. ID band has been verified, appropriate transfer methods have been utilized and patient has been educated on safety devices      Problem: Skin Integrity:  Goal: Will show no infection signs and symptoms  Description: Will show no infection signs and symptoms  Outcome: Met This Shift  Note: Patient has not had any signs/symptoms of infection this shift. No redness, drainage, pus, pain, or foul odor at the surgical site. The patient has been afebrile this shift. Hands have been cleaned with soap and water or hand  upon entering and exiting the patient's room. Gloves have been donned as required. The patient and her guest have been educated on prevention of infection and the signs and symptoms of infection   Goal: Absence of new skin breakdown  Description: Absence of new skin breakdown  Outcome: Met This Shift  Note: Patient has been assessed for skin breakdown using the Wilbur scale, patient is continent of urine and stool, no new skin break down noted, patient is able to independently move all extremities.      Problem: Pain:  Goal: Pain level will decrease  Description: Pain level will decrease  Outcome: Met This Shift  Note: Patient has been assessed for pain on a regular bases, patient has been given pain medication as appropriate, patient has been reassessed for pain after medication has been administered  Goal: Control of acute pain  Description: Control of acute pain  Outcome: Met This Shift  Note: Patient is alert and oriented X4. Patient is able to identify the sensation of pain and communicate the need for pain medication appropriately.   Goal: Control of chronic pain  Description: Control of chronic pain  Outcome: Met This Shift  Note: Chronic pain controled with PRN pain medication

## 2020-11-06 NOTE — PROGRESS NOTES
Physical Therapy    Facility/Department: [address] ORTHOPEDICS  Daily Treatment Note  Co Treat with OT    This note serves as patient discharge summary if pt discharges prior to next PT visit      NAME: Frank Alfonso  : 1940  MRN: [id number]    Date of Service: 2020    Discharge Recommendations:  5-7 sessions per week   Frank Alfonso scored a 15/24 on the AM-PAC short mobility form. Current research shows that an AM-PAC score of 17 or less is typically not associated with a discharge to the patient's home setting. Based on the patient's AM-PAC score and their current functional mobility deficits, it is recommended that the patient have 5-7 sessions per week of Physical Therapy at d/c to increase the patient's independence. At this time, this patient demonstrates the endurance, and/or tolerance for 3 hours of therapy each day, with a treatment frequency of 5-7x/wk. Please see assessment section for further patient specific details. PT Equipment Recommendations  Equipment Needed: No  Other: cont to assess    Assessment   Body structures, Functions, Activity limitations: Decreased functional mobility ; Decreased endurance; Increased pain;Decreased strength;Decreased balance;Decreased coordination;Decreased safe awareness  Assessment: Pt is an 78 y.o. F. admitted 11/3 for neoplasm, underwent partial L nephrectomy. She presents in pain, and with decreased activity tolerance. Status 2020: Mod A for bed mobility, Min A/cues/extra time for transfers, and CGA wh walker ambulation x 21'. All mobility is slow and guarded, with leti citing 6/10 L flank pain (appears to be higher per non verbal behavior). She would benefit from continued skilled therapy of mod-high intensity to maximize tolerance, independence, and safety of functional mobility, prior to returning home with dtr as prior.  Will cont to see while in the hospital.  Specific instructions for Next Treatment: Improve strength, balance, endurance  Prognosis: Good  Decision Making: Low Complexity  History: as noted  Exam: Strength; ROM; Balance; Ambulation  Clinical Presentation: Stable  PT Education: Goals; General Safety;Gait Training;Orientation;Plan of Care;PT Role;Home Exercise Program  REQUIRES PT FOLLOW UP: Yes  Activity Tolerance  Activity Tolerance: Patient limited by pain; Patient limited by fatigue       Patient Diagnosis(es): The primary encounter diagnosis was Encounter for preadmission testing. A diagnosis of Neoplasm of uncertain behavior of left kidney was also pertinent to this visit. has a past medical history of Atrial fibrillation ([facility] [address] Diabetes mellitus ([facility] [address] Hyperlipidemia, Hypertension, Neuropathy, Pacemaker, and Renal cancer ([facility] [address] has a past surgical history that includes Tonsillectomy; Cholecystectomy; pacemaker placement; Colonoscopy; and partial nephrectomy (Left, 11/3/2020). Restrictions  Restrictions/Precautions  Restrictions/Precautions: Fall Risk  Position Activity Restriction  Other position/activity restrictions: IVF R UE        Subjective  General  Chart Reviewed: Yes  Patient assessed for rehabilitation services?: Yes  Additional Pertinent Hx: 77 yo female found to have renal cell CA, underwent L open partial nephrectomy via Dr. Radha Carr 11-3-2020. Other PMHx as noted. Response To Previous Treatment: Patient with no complaints from previous session. Family / Caregiver Present: Yes(dtr)  Referring Practitioner: Dr. Shane Rojas  Subjective  Subjective: Patient in bed. Agreeable to therapy with gentle encouragement. Reports L flank pain, increased with activity. Had been premedicated. Using pillow against L side to splint, except for during ambulation.   Pain Screening  Patient Currently in Pain: Yes    Orientation  Orientation  Overall Orientation Status: Within Functional Limits     Social/Functional History  Social/Functional History  Lives With: Daughter  Type of Home: House  Home Layout: One level  Home Access: Stairs to enter with rails  Entrance Stairs - Number of Steps: 2-3 SHANE with HR  Bathroom Shower/Tub: Walk-in shower  Bathroom Toilet: Handicap height  Bathroom Equipment: Grab bars in shower, Shower chair, Grab bars around toilet  Home Equipment: Cane, Rolling walker(Tri-cane)  ADL Assistance: Independent  Homemaking Assistance: (Pt helps around the kitchen, and folds laundry)  Ambulation Assistance: Independent(Uses cane in community, no device at home)  Transfer Assistance: Independent  Active : No  Mode of Transportation: Car  Occupation: Retired  Leisure & Hobbies: reads, watch TV, clean kitchen, fold laundry, plays with 15 year old grandson, great grandsons  Additional Comments: Denies hx of recent falls     Cognition   Cognition  Overall Cognitive Status: WFL  Cognition Comment: possible short term memory impairments    Objective  Bed mobility  Supine to Sit: Moderate assistance  Scooting: Moderate assistance  Comment: OT used priya pad for supine to sit & to scoot forward, guarding d/t L flank pain. Transfers  Sit to Stand: Minimal Assistance;Contact guard assistance(cues. EOB, commode x 2.)  Stand to sit: Minimal Assistance;Contact guard assistance(Cues. Commode x 2, BS chair)  Ambulation  Ambulation?: Yes  Ambulation 1  Surface: level tile  Device: Rolling Walker  Assistance: Contact guard assistance;Minimal assistance  Quality of Gait: Pt ambulated slowly with walker, moving with step-through pattern, shortened steps. Quietly tearful throughout. Stops intermittently due to pain L flank. Variable posture, but improves to more erect trunk during gait. Gait Deviations: Slow Taylor;Decreased step length;Decreased step height  Distance: 10', 5'. 21'  Comments: Patient sits on commode x 2 and urinates. Some flatulence but no BM. Refer to OT notes for details.   Stairs/Curb  Stairs?: No  Balance  Posture: Good  Sitting - Static: Good  Sitting - Dynamic: Good  Standing - Static: Good(@

## 2020-11-06 NOTE — PROGRESS NOTES
Patient in bed, she has been up to the bathroom several times this shift. She did work with therapy and refused when I wanted to walker her in the hallway. She did eat her lunch and breakfast although they were light meals. Pain controled with PRN pain medications. Blood clot prevention compression boots and Lovenox. She is smiling this afternoon and looks more comfortable than this morning. She continues to be pale. Vitals stable. Call light in reach, bed alarm set.  Will monitor

## 2020-11-06 NOTE — PROGRESS NOTES
Dressing change completed to patient L flank after dressing fell off while patient was using the restroom. New ABD pad and 4x4 in place, secured w/ paper tape. Fresh ice packs applied to patient L flank per request.     Patient repositioned in bed w/ several pillows and blankets. Patient reports comfort w/ these interventions and denies further physical/emotional needs. Call light, telephone, and bed side table are within reach.

## 2020-11-07 LAB
ABO/RH: NORMAL
ANTIBODY SCREEN: NORMAL
BLOOD BANK DISPENSE STATUS: NORMAL
BLOOD BANK PRODUCT CODE: NORMAL
BPU ID: NORMAL
DESCRIPTION BLOOD BANK: NORMAL
GLUCOSE BLD-MCNC: 103 MG/DL (ref 70–99)
GLUCOSE BLD-MCNC: 138 MG/DL (ref 70–99)
GLUCOSE BLD-MCNC: 186 MG/DL (ref 70–99)
GLUCOSE BLD-MCNC: 264 MG/DL (ref 70–99)
HCT VFR BLD CALC: 20.9 % (ref 36–48)
HCT VFR BLD CALC: 24.3 % (ref 36–48)
HEMOGLOBIN: 6.9 G/DL (ref 12–16)
HEMOGLOBIN: 8 G/DL (ref 12–16)
MCH RBC QN AUTO: 29.8 PG (ref 26–34)
MCH RBC QN AUTO: 30.3 PG (ref 26–34)
MCHC RBC AUTO-ENTMCNC: 32.8 G/DL (ref 31–36)
MCHC RBC AUTO-ENTMCNC: 32.9 G/DL (ref 31–36)
MCV RBC AUTO: 90.7 FL (ref 80–100)
MCV RBC AUTO: 91.9 FL (ref 80–100)
PDW BLD-RTO: 14.4 % (ref 12.4–15.4)
PDW BLD-RTO: 14.5 % (ref 12.4–15.4)
PERFORMED ON: ABNORMAL
PLATELET # BLD: 179 K/UL (ref 135–450)
PLATELET # BLD: 197 K/UL (ref 135–450)
PMV BLD AUTO: 8.9 FL (ref 5–10.5)
PMV BLD AUTO: 9.2 FL (ref 5–10.5)
RBC # BLD: 2.28 M/UL (ref 4–5.2)
RBC # BLD: 2.68 M/UL (ref 4–5.2)
WBC # BLD: 11.1 K/UL (ref 4–11)
WBC # BLD: 11.7 K/UL (ref 4–11)

## 2020-11-07 PROCEDURE — 94760 N-INVAS EAR/PLS OXIMETRY 1: CPT

## 2020-11-07 PROCEDURE — 86900 BLOOD TYPING SEROLOGIC ABO: CPT

## 2020-11-07 PROCEDURE — P9016 RBC LEUKOCYTES REDUCED: HCPCS

## 2020-11-07 PROCEDURE — 86850 RBC ANTIBODY SCREEN: CPT

## 2020-11-07 PROCEDURE — 36415 COLL VENOUS BLD VENIPUNCTURE: CPT

## 2020-11-07 PROCEDURE — 6360000002 HC RX W HCPCS: Performed by: NURSE PRACTITIONER

## 2020-11-07 PROCEDURE — 97530 THERAPEUTIC ACTIVITIES: CPT

## 2020-11-07 PROCEDURE — 2580000003 HC RX 258: Performed by: UROLOGY

## 2020-11-07 PROCEDURE — 86901 BLOOD TYPING SEROLOGIC RH(D): CPT

## 2020-11-07 PROCEDURE — 36430 TRANSFUSION BLD/BLD COMPNT: CPT

## 2020-11-07 PROCEDURE — 86923 COMPATIBILITY TEST ELECTRIC: CPT

## 2020-11-07 PROCEDURE — 1200000000 HC SEMI PRIVATE

## 2020-11-07 PROCEDURE — 85027 COMPLETE CBC AUTOMATED: CPT

## 2020-11-07 PROCEDURE — 97116 GAIT TRAINING THERAPY: CPT

## 2020-11-07 PROCEDURE — 97535 SELF CARE MNGMENT TRAINING: CPT

## 2020-11-07 PROCEDURE — 6370000000 HC RX 637 (ALT 250 FOR IP): Performed by: UROLOGY

## 2020-11-07 RX ORDER — 0.9 % SODIUM CHLORIDE 0.9 %
20 INTRAVENOUS SOLUTION INTRAVENOUS ONCE
Status: COMPLETED | OUTPATIENT
Start: 2020-11-07 | End: 2020-11-07

## 2020-11-07 RX ORDER — ACETAMINOPHEN 325 MG/1
650 TABLET ORAL EVERY 4 HOURS PRN
Status: DISCONTINUED | OUTPATIENT
Start: 2020-11-07 | End: 2020-11-10 | Stop reason: HOSPADM

## 2020-11-07 RX ADMIN — Medication 10 ML: at 20:40

## 2020-11-07 RX ADMIN — PREGABALIN 150 MG: 75 CAPSULE ORAL at 20:40

## 2020-11-07 RX ADMIN — OXYCODONE HYDROCHLORIDE 10 MG: 10 TABLET ORAL at 14:41

## 2020-11-07 RX ADMIN — PANTOPRAZOLE SODIUM 40 MG: 40 TABLET, DELAYED RELEASE ORAL at 16:47

## 2020-11-07 RX ADMIN — PREGABALIN 150 MG: 75 CAPSULE ORAL at 10:37

## 2020-11-07 RX ADMIN — INSULIN LISPRO 2 UNITS: 100 INJECTION, SOLUTION INTRAVENOUS; SUBCUTANEOUS at 22:09

## 2020-11-07 RX ADMIN — MORPHINE SULFATE 2 MG: 2 INJECTION, SOLUTION INTRAMUSCULAR; INTRAVENOUS at 12:59

## 2020-11-07 RX ADMIN — NORTRIPTYLINE HYDROCHLORIDE 10 MG: 10 CAPSULE ORAL at 20:40

## 2020-11-07 RX ADMIN — OXYCODONE HYDROCHLORIDE 10 MG: 10 TABLET ORAL at 19:30

## 2020-11-07 RX ADMIN — SODIUM CHLORIDE: 9 INJECTION, SOLUTION INTRAVENOUS at 03:19

## 2020-11-07 RX ADMIN — SODIUM CHLORIDE 20 ML: 9 INJECTION, SOLUTION INTRAVENOUS at 13:14

## 2020-11-07 RX ADMIN — Medication 10 ML: at 10:37

## 2020-11-07 RX ADMIN — ACETAMINOPHEN 650 MG: 325 TABLET ORAL at 16:47

## 2020-11-07 RX ADMIN — PANTOPRAZOLE SODIUM 40 MG: 40 TABLET, DELAYED RELEASE ORAL at 05:52

## 2020-11-07 RX ADMIN — BISACODYL 5 MG: 5 TABLET, COATED ORAL at 10:36

## 2020-11-07 RX ADMIN — OXYCODONE HYDROCHLORIDE 10 MG: 10 TABLET ORAL at 05:54

## 2020-11-07 RX ADMIN — OXYCODONE HYDROCHLORIDE 10 MG: 10 TABLET ORAL at 10:37

## 2020-11-07 RX ADMIN — INSULIN LISPRO 1 UNITS: 100 INJECTION, SOLUTION INTRAVENOUS; SUBCUTANEOUS at 16:47

## 2020-11-07 ASSESSMENT — PAIN DESCRIPTION - PROGRESSION
CLINICAL_PROGRESSION: GRADUALLY WORSENING
CLINICAL_PROGRESSION: NOT CHANGED
CLINICAL_PROGRESSION: GRADUALLY IMPROVING
CLINICAL_PROGRESSION: GRADUALLY WORSENING
CLINICAL_PROGRESSION: NOT CHANGED
CLINICAL_PROGRESSION: NOT CHANGED

## 2020-11-07 ASSESSMENT — PAIN DESCRIPTION - ONSET
ONSET: ON-GOING

## 2020-11-07 ASSESSMENT — PAIN SCALES - GENERAL
PAINLEVEL_OUTOF10: 0
PAINLEVEL_OUTOF10: 0
PAINLEVEL_OUTOF10: 5
PAINLEVEL_OUTOF10: 8
PAINLEVEL_OUTOF10: 6
PAINLEVEL_OUTOF10: 0
PAINLEVEL_OUTOF10: 8
PAINLEVEL_OUTOF10: 0
PAINLEVEL_OUTOF10: 6
PAINLEVEL_OUTOF10: 0
PAINLEVEL_OUTOF10: 5
PAINLEVEL_OUTOF10: 6

## 2020-11-07 ASSESSMENT — PAIN DESCRIPTION - LOCATION
LOCATION: FLANK

## 2020-11-07 ASSESSMENT — PAIN DESCRIPTION - DESCRIPTORS
DESCRIPTORS: ACHING
DESCRIPTORS: ACHING;CONSTANT

## 2020-11-07 ASSESSMENT — PAIN DESCRIPTION - PAIN TYPE
TYPE: ACUTE PAIN;SURGICAL PAIN

## 2020-11-07 ASSESSMENT — PAIN DESCRIPTION - FREQUENCY
FREQUENCY: CONTINUOUS

## 2020-11-07 ASSESSMENT — PAIN DESCRIPTION - ORIENTATION
ORIENTATION: LEFT

## 2020-11-07 NOTE — PROGRESS NOTES
Pt Name: Evy Boston Record Number: [id number]  Date of Birth 1940   Today's Date: 11/7/2020      Subjective:  She states she is feeling better this am.  She is still weak and having left sided pain. No fever. Hgb down to 6.9 this am.  VSS. Tolerating regular diet. ROS: Constitutional: No fever    Vitals:  Vitals:    11/06/20 2337 11/07/20 0158 11/07/20 0427 11/07/20 0749   BP: (!) 146/63  132/72 114/67   Pulse: 89  81 80   Resp: 15  16 15   Temp: 98.6 °F (37 °C)  98.6 °F (37 °C) 98.4 °F (36.9 °C)   TempSrc: Oral  Oral Oral   SpO2: 95%  95% 96%   Weight:  187 lb 9.8 oz (85.1 kg)     Height:         I/O last 3 completed shifts:   In: 2699 [P.O.:960; I.V.:1739]  Out: 350 [Urine:350]    Exam:  General: Awake, oriented, no acute distress  Respiratory: Nonlabored breathing  Abdomen: Soft, appropriately, tender, non-distended, no masses, incision intact  Skin: Skin color, texture, turgor normal, no rashes or lesions  Neurologic: no gross deficits    CURRENT MEDICATIONS   Scheduled Meds:   sodium chloride  20 mL Intravenous Once    pregabalin  150 mg Oral BID    fluticasone  2 spray Nasal Daily    nortriptyline  10 mg Oral Nightly    pantoprazole  40 mg Oral BID AC    sodium chloride flush  10 mL Intravenous 2 times per day    bisacodyl  5 mg Oral Daily    insulin lispro  0-6 Units Subcutaneous TID WC    insulin lispro  0-3 Units Subcutaneous Nightly    enoxaparin  30 mg Subcutaneous Daily     Continuous Infusions:   dextrose       PRN Meds:.morphine, oxyCODONE, sodium chloride flush, promethazine **OR** ondansetron, glucose, dextrose, glucagon (rDNA), dextrose    LABS     Recent Labs     11/05/20  1018 11/06/20  0444 11/07/20  0510   WBC 16.8* 14.0* 11.7*   HGB 7.9* 7.7* 6.9*   HCT 23.5* 23.1* 20.9*    195 179    135*  --    K 5.0 4.0  --     103  --    CO2 20* 20*  --    BUN 25* 24*  --    CREATININE 1.7* 1.7*  --    CALCIUM 8.1* 8.1*  --            ASSESSMENT 1. Hospital day # 4  2. Left open partial nephrectomy  3. Anemia due to operative blood loss  4. Post operative pain  PLAN   1. Will transfuse 1 unit rbc and repeat h/h this afternoon and tomorrow  2.  Hold lovenox for now due to bleeding    Aayush Johnson MD 11/7/2020 11:12 AM

## 2020-11-07 NOTE — PROGRESS NOTES
Patient resting quietly in bed. Blood transfusion complete. VSS. Patient got some rest this afternoon. Dressing dry and intact. Ambulates with walker. Pain tolerable at this time. Call light and belongings within reach. Will continue to monitor.     Electronically signed by Alejandra Tejada RN on 11/7/2020 at 6:06 PM

## 2020-11-07 NOTE — PROGRESS NOTES
Physical Therapy      Facility/Department: [address] ORTHOPEDICS  Daily Treatment Note  Co Treat with OT    This note serves as patient discharge summary if pt discharges prior to next PT visit      NAME: Kusum Curtis  : 1940  MRN: [id number]    Date of Service: 2020    Discharge Recommendations:  5-7 sessions per week   Kusum Curtis scored a 15/24 on the AM-PAC short mobility form. Current research shows that an AM-PAC score of 17 or less is typically not associated with a discharge to the patient's home setting. Based on the patient's AM-PAC score and their current functional mobility deficits, it is recommended that the patient have 5-7 sessions per week of Physical Therapy at d/c to increase the patient's independence. At this time, this patient demonstrates the endurance, and/or tolerance for 3 hours of therapy each day, with a treatment frequency of 5-7x/wk. Please see assessment section for further patient specific details. PT Equipment Recommendations  Equipment Needed: No  Other: cont to assess    Assessment   Body structures, Functions, Activity limitations: Decreased functional mobility ; Decreased endurance; Increased pain;Decreased strength;Decreased balance;Decreased coordination;Decreased safe awareness  Assessment: Pt is an 78 y.o. F. admitted 11/3 for neoplasm, underwent partial L nephrectomy. She presents in pain, and with decreased activity tolerance. Status 2020: Mod A 2 for bed mobility sit to supine, Min A-CGA/cues/extra time for transfers, and CGA wh walker ambulation x 10'. Max distance tolerated. All mobility is slow and guarded, with patient citing L flank pain. She would benefit from continued skilled therapy of mod-high intensity to maximize tolerance, independence, and safety of functional mobility, prior to returning home with dtr as prior.  Will cont to see while in the hospital.  Treatment Diagnosis: Impaired functional mobility  Specific instructions for Next Treatment: Improve strength, balance, endurance  Prognosis: Good  History: as noted  Exam: Strength; ROM; Balance; Ambulation  Barriers to Learning: Pain; Confusion  REQUIRES PT FOLLOW UP: Yes  Activity Tolerance  Activity Tolerance: Patient limited by pain; Patient limited by fatigue  Activity Tolerance: 11-7-2020: Patient to receive 1 unit PRBCs       Patient Diagnosis(es): The primary encounter diagnosis was Encounter for preadmission testing. A diagnosis of Neoplasm of uncertain behavior of left kidney was also pertinent to this visit. has a past medical history of Atrial fibrillation ([facility] [address] Diabetes mellitus ([facility] [address] Hyperlipidemia, Hypertension, Neuropathy, Pacemaker, and Renal cancer ([facility] [address] has a past surgical history that includes Tonsillectomy; Cholecystectomy; pacemaker placement; Colonoscopy; and partial nephrectomy (Left, 11/3/2020). Restrictions  Restrictions/Precautions  Restrictions/Precautions: Fall Risk  Position Activity Restriction  Other position/activity restrictions: IVF R UE  1-7-2020: to recieve 1 unit PRBCs  Vision/Hearing  Native  Subjective  General  Additional Pertinent Hx: 77 yo female found to have renal cell CA, underwent L open partial nephrectomy via Dr. Nathalie Portillo 11-3-2020. 11-7-2020: to receive 1 unit PRBCs. Other PMHx as noted. Response To Previous Treatment: Patient with no complaints from previous session. Family / Caregiver Present: Yes(dtr)  Subjective  Subjective: Patient in BS chair transferring to stance with OT assist at PT entry. Reports L lateral and posterior flank pain. Doesn't rate, but tearful throughout session. Appears at least moderate.   Pain Screening  Patient Currently in Pain: Denies    Orientation  Orientation  Overall Orientation Status: Within Functional Limits     Social/Functional History  Social/Functional History  Lives With: Daughter  Type of Home: House  Home Layout: One level  Home Access: Stairs to enter with rails  Entrance Stairs - Number of Steps: 2-3 SHANE with HR  Bathroom Shower/Tub: Walk-in shower  Bathroom Toilet: Handicap height  Bathroom Equipment: Grab bars in shower, Shower chair, Grab bars around toilet  Home Equipment: Cane, Rolling walker(Tri-cane)  ADL Assistance: [address]: (Pt helps around the kitchen, and folds laundry)  Ambulation Assistance: Independent(Uses cane in community, no device at home)  Transfer Assistance: Independent  Active : No  Mode of Transportation: Car  Occupation: Retired  Leisure & Hobbies: reads, watch TV, clean kitchen, fold laundry, plays with 15 year old grandson, great grandsons  Additional Comments: Denies hx of recent falls     Cognition   Cognition  Overall Cognitive Status: [facility]  Cognition Comment: possible short term memory impairments    Objective  Bed mobility  Supine to Sit: Unable to assess(Patient in BS chair at end of session)  Sit to Supine: Moderate assistance;2 Person assistance  Scooting: Moderate assistance(cues to use LEs)  Transfers  Sit to Stand: Minimal Assistance;Contact guard assistance(cues. BS chair, commode.)  Stand to sit: Minimal Assistance;Contact guard assistance(Cues. Commode, EOB)  Ambulation  Ambulation?: Yes  Ambulation 1  Surface: level tile  Device: Rolling Walker  Assistance: Contact guard assistance;Minimal assistance  Quality of Gait: Pt ambulated slowly with walker, moving with step-through pattern, Trace improved stride length. Trace improved trunk erect posturing. Gait Deviations: Slow Taylor;Decreased step length;Decreased step height  Distance: 10' x 2  Comments: Patient sits on commode and urinates. Some flatulence but no BM. SBA in stance as patient raises briefs.  .  Stairs/Curb  Stairs?: No  Balance  Posture: Good  Sitting - Static: Good  Sitting - Dynamic: Good  Standing - Static: Good(@ RW)  Standing - Dynamic: Good(@ RW)  Comments: Limited stance and ambulation toleance, using RW, with patient citing high L flank pain.  Exercises  Bridging: (Partial bridge x 1.)  Heelslides: Cues to perform with control, in painfree range. X 2 B. Plan   Plan  Times per week: 3-5  Specific instructions for Next Treatment: Improve strength, balance, endurance  Current Treatment Recommendations: Functional Mobility Training, Gait Training, Stair training, Safety Education & Training  Safety Devices  Type of devices: Bed alarm in place, Call light within reach, Gait belt, Patient at risk for falls, Left in bed, Nurse notified(RN Anders Lesch informed)  Restraints  Initially in place: No    AM-PAC Score  AM-PAC Inpatient Mobility Raw Score : 15 (11/07/20 1332)  AM-PAC Inpatient T-Scale Score : 39.45 (11/07/20 1332)  Mobility Inpatient CMS 0-100% Score: 57.7 (11/07/20 1332)  Mobility Inpatient CMS G-Code Modifier : CK (11/07/20 1332)     Goals  Short term goals  Time Frame for Short term goals: In 2-3 days pt will perform. 11-7-2020: all goals ongoing  Short term goal 1: Bed mobility CGA  Short term goal 2: Transfers CGA  Short term goal 3: Ambulation 48' with RW, CGA  Short term goal 4: Ascend/Descend 3 steps with CGA  Long term goals  Time Frame for Long term goals : LTG = STG  Patient Goals   Patient goals :  To return directly home    Therapy Time   Individual Concurrent Group Co-treatment   Time In 1306         Time Out 1345         Minutes Lynda Romo PT  Electronically signed by Ismael Alejo, PT [id number] [phone])  on 11/7/2020 at 1:47 PM

## 2020-11-07 NOTE — PROGRESS NOTES
Occupational Therapy  Facility/Department: [address] ORTHOPEDICS  Daily Treatment Note  NAME: Chacorta Handley  : 1940  MRN: [id number]    Date of Service: 2020    Discharge Recommendations:  Patient would benefit from continued therapy after discharge, 5-7 sessions per week, Continue to assess pending progress  OT Equipment Recommendations  Other: TBD  Chacorta Handley scored a 17/24 on the AM-PAC ADL Inpatient form. Current research shows that an AM-PAC score of 17 or less is typically not associated with a discharge to the patient's home setting. Based on the patient's AM-PAC score and their current ADL deficits, it is recommended that the patient have 5-7 sessions per week of Occupational Therapy at d/c to increase the patient's independence. At this time, this patient demonstrates the endurance, and/or tolerance for 3 hours of therapy each day, with a treatment frequency of 5-7x/wk. Please see assessment section for further patient specific details. If patient discharges prior to next session this note will serve as a discharge summary. Please see below for the latest assessment towards goals. Assessment   Performance deficits / Impairments: Decreased functional mobility ; Decreased endurance;Decreased ADL status; Decreased balance;Decreased high-level IADLs  Assessment: pt having 8/10 L flank pain, RN administered pain meds at beginning of session; pt's daughter present. She is agreeable for OT/PT co-tx for fxl mob, t/f & toilet tasks. Pt required CG/min A of 1-2, given cues & guidance for safe hand placement during sit<>stand transitions. She needed at least mod A to manage clothing & perform kevin hygiene (was not able to have BM). She needed mod A of 2 for sit<supine--having increased pain thru L flank and back pain.  Pt's RN notified once pt placed in bed, she is due to receive 1 unit PRBC today  Treatment Diagnosis: impaired ADLs, t/f, fxl mob, strength  Prognosis: Good  Decision Making: Medium Complexity  History: see above  Exam: mobility, toileting, mobility  Assistance / Modification: min/CG assist of 1-2  OT Education: ADL Adaptive Strategies;Transfer Training  Patient Education: reviewed log roll for bed mob, clutching pillow on L side for support, safe hand placement during all t/f. Barriers to Learning: mild anxiety  REQUIRES OT FOLLOW UP: Yes  Activity Tolerance  Activity Tolerance: Patient limited by pain; Patient limited by fatigue  Activity Tolerance: fair tolerance due to L flank pain, nausea; waiting for blood transfusion  Safety Devices  Safety Devices in place: Yes  Type of devices: Bed alarm in place; Left in bed;Nurse notified;Call light within reach;Gait belt         Patient Diagnosis(es): The primary encounter diagnosis was Encounter for preadmission testing. A diagnosis of Neoplasm of uncertain behavior of left kidney was also pertinent to this visit. has a past medical history of Atrial fibrillation ([facility] [address] Diabetes mellitus ([facility] [address] Hyperlipidemia, Hypertension, Neuropathy, Pacemaker, and Renal cancer ([facility] [address] has a past surgical history that includes Tonsillectomy; Cholecystectomy; pacemaker placement; Colonoscopy; and partial nephrectomy (Left, 11/3/2020). Restrictions  Restrictions/Precautions  Restrictions/Precautions: Fall Risk  Position Activity Restriction  Other position/activity restrictions: IVF R UE  1-7-2020: to recieve 1 unit PRBCs  Subjective   General  Chart Reviewed: Yes, Orders, Progress Notes, History and Physical  Patient assessed for rehabilitation services?: Yes  Additional Pertinent Hx: Neoplasm of Left kidney.   Family / Caregiver Present: Yes  Referring Practitioner: Dr. Joey Roach  Diagnosis: s/p left partial nephrectomy  Subjective  Subjective: met in room, pt sitting in recliner, daughter present; pt reporting 8/10 L flank pain--RN aware & gave meds  General Comment  Comments: agreeable for OOB activity--pt asking to use toilet      Orientation     Objective ADL  Toileting: Moderate assistance(pt incont of some urine in brief, she participated in clothing management in front & wiping in front; OT helped w/ rear hygiene & clothing management, used GB)  Additional Comments: Pt with increased L flank pain with movement. Given pillow to hold against this area while trying to have BM--unsuccessful        Balance  Sitting Balance: Stand by assistance(having nausea while seated on toilet)  Standing Balance: Contact guard assistance  Standing Balance  Time: 2-3 minutes  Activity: during fxl mob, t/f, toileting  Comment: used support of RW, GB or BSC  Functional Mobility  Functional - Mobility Device: Rolling Walker  Activity: To/from bathroom  Assist Level: Contact guard assistance  Functional Mobility Comments: pt w/ weakness, feeling nauseated, having L flank pain which impacted her mobility using RW,  Toilet Transfers  Toilet - Technique: Ambulating  Equipment Used: Standard bedside commode  Toilet Transfer: Contact guard assistance  Toilet Transfers Comments: BSC over toilet, also used GB  Wheelchair Bed Transfers  Wheelchair/Bed - Technique: Ambulating  Equipment Used: Bed;Other  Level of Asssistance: Contact guard assistance  Wheelchair Transfers Comments: CG/min A using RW for bed, toilet & recliner transfers, A of another to manage IV pole  Bed mobility  Sit to Supine: Moderate assistance;2 Person assistance  Scooting: Moderate assistance  Comment: difficulty lifting LEs & guiding UB d/t L flank pain during sit<supine  Transfers  Sit to stand: Contact guard assistance  Stand to sit: Contact guard assistance                    Vision  Patient Visual Report: No visual complaint reported.   Vision Comment: reading glasses  Cognition  Overall Cognitive Status: WFL  Cognition Comment: possible short term memory impairments                                         Plan   Plan  Times per week: 3-5  Times per day: Daily  Plan weeks: 1-3 more day  Specific instructions for Next Treatment: co-tx in PM for further ambulation/OOB act  Current Treatment Recommendations: Strengthening, Patient/Caregiver Education & Training, Home Management Training, Equipment Evaluation, Education, & procurement, Balance Training, Functional Mobility Training, Endurance Training, Safety Education & Training, Self-Care / ADL, Pain Management  Plan Comment: recommend rehab        AM-PAC Score        AM-PAC Inpatient Daily Activity Raw Score: 17 (11/07/20 1339)  AM-PAC Inpatient ADL T-Scale Score : 37.26 (11/07/20 1339)  ADL Inpatient CMS 0-100% Score: 50.11 (11/07/20 1339)  ADL Inpatient CMS G-Code Modifier : CK (11/07/20 1339)    Goals  Short term goals  Time Frame for Short term goals: 1 week  Short term goal 1: Pt will be mod I with functional transfers  Short term goal 2: Pt will be mod I with functional mobility  Short term goal 3: Pt will be mod I with toileting  Short term goal 4: Pt will be mod I with bathing and dressing  Short term goal 5: Pt will be mod I with bed mobility  Long term goals  Time Frame for Long term goals : STG = LTG  Patient Goals   Patient goals : Pt wants to be home with daughter       Therapy Time   Individual Concurrent Group Co-treatment   Time In 1300         Time Out 1340         Minutes 40         Timed Code Treatment Minutes: [address], OTR/L #[id number]

## 2020-11-07 NOTE — PROGRESS NOTES
1 unit of PRBCs infusing at 125 ml/hr. Patient tolerating well. No complications. Resting quietly in bed with eyes closed. Will continue to monitor.      Electronically signed by Everardo Wagner RN on 11/7/2020 at 3:08 PM

## 2020-11-07 NOTE — PROGRESS NOTES
Pt AAO x4. Pt up to the BR with 1 assist and the walker. She is anxious and having a lot of pain in her left flank. Rating pain 10/10 on pain scale. PRN Morphine given. Pt sat on the toilet about 30 minutes trying to have a BM. She did pass a lot of flatus. Assessment completed and charted. Left flank with staples to surgical incision BETHANY. No drainage noted. Pt using a pillow for splinting along surgical incision. Denies any needs at this time. Call light in reach. Will monitor.

## 2020-11-07 NOTE — PLAN OF CARE
Problem: Falls - Risk of:  Goal: Will remain free from falls  Description: Will remain free from falls  11/7/2020 0316 by Tracy Greene RN  Outcome: Ongoing     Problem: Falls - Risk of:  Goal: Absence of physical injury  Description: Absence of physical injury  11/7/2020 0316 by Tracy Greene RN  Outcome: Ongoing   Fall risk assessment completed every shift. All precautions in place. Pt has call light within reach at all times. Room clear of clutter. Pt aware to call for assistance when getting up. Problem: Skin Integrity:  Goal: Will show no infection signs and symptoms  Description: Will show no infection signs and symptoms  11/7/2020 0316 by Tracy Greene RN  Outcome: Ongoing     Problem: Skin Integrity:  Goal: Absence of new skin breakdown  Description: Absence of new skin breakdown  11/7/2020 0316 by Tracy Greene RN  Outcome: Ongoing   Skin assessment completed every shift. Pt assessed for incontinence, appropriate barrier wipes used prn. Pt encouraged to turn/rotate every 2 hours. Assistance provided if pt unable to do so themselves. Problem: Pain:  Goal: Pain level will decrease  Description: Pain level will decrease  11/7/2020 0316 by Tracy Greene RN  Outcome: Ongoing     Problem: Pain:  Goal: Control of acute pain  Description: Control of acute pain  11/7/2020 0316 by Tracy Greene RN  Outcome: Ongoing     Problem: Pain:  Goal: Control of chronic pain  Description: Control of chronic pain  11/7/2020 0316 by Tracy Greene RN  Outcome: Ongoing   Pain/discomfort being managed with PRN analgesics per MD orders. Pt able to express presence and absence of pain and rate pain appropriately using numerical scale.

## 2020-11-07 NOTE — PROGRESS NOTES
Patient complaining of 6/10 left flank pain. Oxycodone given with morning meds. Alert and oriented. VSS. IV fluids d/c per order. Dressing on left flank clean, dry, and intact. Ambulates with walker. Declined eating breakfast. Call light and belongings within reach. Will continue to monitor.     Electronically signed by Samira Chambers RN on 11/7/2020 at 11:30 AM

## 2020-11-08 LAB
ANION GAP SERPL CALCULATED.3IONS-SCNC: 12 MMOL/L (ref 3–16)
BUN BLDV-MCNC: 21 MG/DL (ref 7–20)
CALCIUM SERPL-MCNC: 8.7 MG/DL (ref 8.3–10.6)
CHLORIDE BLD-SCNC: 104 MMOL/L (ref 99–110)
CO2: 19 MMOL/L (ref 21–32)
CREAT SERPL-MCNC: 2 MG/DL (ref 0.6–1.2)
GFR AFRICAN AMERICAN: 29
GFR NON-AFRICAN AMERICAN: 24
GLUCOSE BLD-MCNC: 133 MG/DL (ref 70–99)
GLUCOSE BLD-MCNC: 133 MG/DL (ref 70–99)
GLUCOSE BLD-MCNC: 192 MG/DL (ref 70–99)
GLUCOSE BLD-MCNC: 193 MG/DL (ref 70–99)
GLUCOSE BLD-MCNC: 215 MG/DL (ref 70–99)
HCT VFR BLD CALC: 25.3 % (ref 36–48)
HEMOGLOBIN: 8.5 G/DL (ref 12–16)
MCH RBC QN AUTO: 29.8 PG (ref 26–34)
MCHC RBC AUTO-ENTMCNC: 33.5 G/DL (ref 31–36)
MCV RBC AUTO: 89 FL (ref 80–100)
PDW BLD-RTO: 14.5 % (ref 12.4–15.4)
PERFORMED ON: ABNORMAL
PLATELET # BLD: 234 K/UL (ref 135–450)
PMV BLD AUTO: 8.7 FL (ref 5–10.5)
POTASSIUM SERPL-SCNC: 4 MMOL/L (ref 3.5–5.1)
RBC # BLD: 2.84 M/UL (ref 4–5.2)
SODIUM BLD-SCNC: 135 MMOL/L (ref 136–145)
WBC # BLD: 11.9 K/UL (ref 4–11)

## 2020-11-08 PROCEDURE — 1200000000 HC SEMI PRIVATE

## 2020-11-08 PROCEDURE — 6370000000 HC RX 637 (ALT 250 FOR IP): Performed by: UROLOGY

## 2020-11-08 PROCEDURE — 85027 COMPLETE CBC AUTOMATED: CPT

## 2020-11-08 PROCEDURE — 2580000003 HC RX 258: Performed by: UROLOGY

## 2020-11-08 PROCEDURE — 94760 N-INVAS EAR/PLS OXIMETRY 1: CPT

## 2020-11-08 PROCEDURE — 80048 BASIC METABOLIC PNL TOTAL CA: CPT

## 2020-11-08 PROCEDURE — 36415 COLL VENOUS BLD VENIPUNCTURE: CPT

## 2020-11-08 RX ADMIN — INSULIN LISPRO 2 UNITS: 100 INJECTION, SOLUTION INTRAVENOUS; SUBCUTANEOUS at 13:05

## 2020-11-08 RX ADMIN — PREGABALIN 150 MG: 75 CAPSULE ORAL at 09:07

## 2020-11-08 RX ADMIN — ACETAMINOPHEN 650 MG: 325 TABLET ORAL at 14:46

## 2020-11-08 RX ADMIN — NORTRIPTYLINE HYDROCHLORIDE 10 MG: 10 CAPSULE ORAL at 20:08

## 2020-11-08 RX ADMIN — Medication 10 ML: at 20:10

## 2020-11-08 RX ADMIN — PANTOPRAZOLE SODIUM 40 MG: 40 TABLET, DELAYED RELEASE ORAL at 17:48

## 2020-11-08 RX ADMIN — INSULIN LISPRO 1 UNITS: 100 INJECTION, SOLUTION INTRAVENOUS; SUBCUTANEOUS at 17:48

## 2020-11-08 RX ADMIN — PREGABALIN 150 MG: 75 CAPSULE ORAL at 20:08

## 2020-11-08 RX ADMIN — OXYCODONE HYDROCHLORIDE 10 MG: 10 TABLET ORAL at 17:48

## 2020-11-08 RX ADMIN — BISACODYL 5 MG: 5 TABLET, COATED ORAL at 09:07

## 2020-11-08 RX ADMIN — ACETAMINOPHEN 650 MG: 325 TABLET ORAL at 20:17

## 2020-11-08 RX ADMIN — Medication 10 ML: at 09:07

## 2020-11-08 RX ADMIN — PANTOPRAZOLE SODIUM 40 MG: 40 TABLET, DELAYED RELEASE ORAL at 06:13

## 2020-11-08 RX ADMIN — OXYCODONE HYDROCHLORIDE 10 MG: 10 TABLET ORAL at 06:13

## 2020-11-08 RX ADMIN — OXYCODONE HYDROCHLORIDE 10 MG: 10 TABLET ORAL at 23:11

## 2020-11-08 RX ADMIN — OXYCODONE HYDROCHLORIDE 10 MG: 10 TABLET ORAL at 13:05

## 2020-11-08 ASSESSMENT — PAIN DESCRIPTION - ONSET
ONSET: ON-GOING

## 2020-11-08 ASSESSMENT — PAIN DESCRIPTION - PAIN TYPE
TYPE: ACUTE PAIN;SURGICAL PAIN
TYPE: ACUTE PAIN
TYPE: ACUTE PAIN;SURGICAL PAIN

## 2020-11-08 ASSESSMENT — PAIN DESCRIPTION - PROGRESSION
CLINICAL_PROGRESSION: GRADUALLY IMPROVING
CLINICAL_PROGRESSION: NOT CHANGED
CLINICAL_PROGRESSION: GRADUALLY IMPROVING
CLINICAL_PROGRESSION: NOT CHANGED
CLINICAL_PROGRESSION: GRADUALLY IMPROVING
CLINICAL_PROGRESSION: NOT CHANGED
CLINICAL_PROGRESSION: GRADUALLY IMPROVING
CLINICAL_PROGRESSION: GRADUALLY IMPROVING
CLINICAL_PROGRESSION: NOT CHANGED
CLINICAL_PROGRESSION: GRADUALLY IMPROVING

## 2020-11-08 ASSESSMENT — PAIN DESCRIPTION - FREQUENCY
FREQUENCY: CONTINUOUS

## 2020-11-08 ASSESSMENT — PAIN SCALES - GENERAL
PAINLEVEL_OUTOF10: 6
PAINLEVEL_OUTOF10: 0
PAINLEVEL_OUTOF10: 6
PAINLEVEL_OUTOF10: 6
PAINLEVEL_OUTOF10: 7
PAINLEVEL_OUTOF10: 0
PAINLEVEL_OUTOF10: 5
PAINLEVEL_OUTOF10: 6
PAINLEVEL_OUTOF10: 6
PAINLEVEL_OUTOF10: 5
PAINLEVEL_OUTOF10: 0
PAINLEVEL_OUTOF10: 3
PAINLEVEL_OUTOF10: 7
PAINLEVEL_OUTOF10: 0

## 2020-11-08 ASSESSMENT — PAIN DESCRIPTION - ORIENTATION
ORIENTATION: LEFT

## 2020-11-08 ASSESSMENT — PAIN - FUNCTIONAL ASSESSMENT
PAIN_FUNCTIONAL_ASSESSMENT: PREVENTS OR INTERFERES SOME ACTIVE ACTIVITIES AND ADLS

## 2020-11-08 ASSESSMENT — PAIN SCALES - WONG BAKER
WONGBAKER_NUMERICALRESPONSE: 0
WONGBAKER_NUMERICALRESPONSE: 2
WONGBAKER_NUMERICALRESPONSE: 2
WONGBAKER_NUMERICALRESPONSE: 0
WONGBAKER_NUMERICALRESPONSE: 2

## 2020-11-08 ASSESSMENT — PAIN DESCRIPTION - LOCATION
LOCATION: FLANK

## 2020-11-08 ASSESSMENT — PAIN DESCRIPTION - DESCRIPTORS
DESCRIPTORS: CONSTANT
DESCRIPTORS: SHARP;SHOOTING
DESCRIPTORS: ACHING
DESCRIPTORS: CONSTANT
DESCRIPTORS: CONSTANT
DESCRIPTORS: CONSTANT;ACHING
DESCRIPTORS: CONSTANT
DESCRIPTORS: CONSTANT;ACHING
DESCRIPTORS: SHARP
DESCRIPTORS: CONSTANT

## 2020-11-08 NOTE — PLAN OF CARE
Problem: Falls - Risk of:  Goal: Will remain free from falls  Description: Will remain free from falls  Outcome: Ongoing  Goal: Absence of physical injury  Description: Absence of physical injury  Outcome: Ongoing   Fall risk assessment completed every shift. All precautions in place. Pt has call light within reach at all times. Room clear of clutter. Pt aware to call for assistance when getting up. Problem: Skin Integrity:  Goal: Will show no infection signs and symptoms  Description: Will show no infection signs and symptoms  Outcome: Ongoing  Goal: Absence of new skin breakdown  Description: Absence of new skin breakdown  Outcome: Ongoing   Skin assessment completed every shift. Pt encouraged to turn/rotate every 2 hours. Assistance provided if pt unable to do so themselves. Problem: Pain:  Goal: Pain level will decrease  Description: Pain level will decrease  Outcome: Ongoing  Goal: Control of acute pain  Description: Control of acute pain  Outcome: Ongoing  Goal: Control of chronic pain  Description: Control of chronic pain  Outcome: Ongoing   Pain/discomfort being managed with PRN analgesics per MD orders. Pt able to express presence and absence of pain and rate pain appropriately using numerical scale.

## 2020-11-08 NOTE — PLAN OF CARE
Problem: Falls - Risk of:  Goal: Will remain free from falls  Description: Will remain free from falls  11/8/2020 1052 by Vishnu Rosen RN  Outcome: Ongoing  Note: Fall risk assessment completed. Fall precautions in place. Call light within reach. Pt educated on calling for assistance before getting up. Walkway free of clutter. Will continue to monitor. Electronically signed by Vishnu Rosne RN on 11/8/2020 at 10:52 AM      Problem: Skin Integrity:  Goal: Will show no infection signs and symptoms  Description: Will show no infection signs and symptoms  11/8/2020 1052 by Vishnu Rosen RN  Outcome: Ongoing  Note: Will monitor skin and mucous membranes. Will turn patient every 2 hours, monitor for friction and sheering, and change dressings as needed. Will preform skin assessment every shift. Electronically signed by Vishnu Rosen RN on 11/8/2020 at 10:53 AM      Problem: Pain:  Goal: Pain level will decrease  Description: Pain level will decrease  11/8/2020 1052 by Vishnu Rosen RN  Outcome: Ongoing  Note: Educated patient on pain management. Will assess patients pain level per unit protocol, and provide pain management measures as needed.    Electronically signed by Vishnu Rosen RN on 11/8/2020 at 10:53 AM

## 2020-11-08 NOTE — PROGRESS NOTES
Patient alert and oriented. Pain 6/10. Morning medications given without difficulty. Breakfast eaten. MD at bedside. Removed dressing from surgical site and left BETHANY. MD aware of 2 open sores/popped blisters on left side of abdomen. OK to dress with mepilex. Assisted to bathroom with walker. Continues to try to have a bowel movement. VSS. Will continue to monitor.      Electronically signed by Kaitlynn Johnston RN on 11/8/2020 at 9:43 AM

## 2020-11-08 NOTE — PROGRESS NOTES
Pt AAO x4. No c/o pain at this time. Assessment completed and charted. Left flank surgical dressing noted to be CDI. Pt denies needs at this time. Call light in reach. Will monitor.

## 2020-11-08 NOTE — PROGRESS NOTES
Patient resting quietly in bed. Dinner eaten. Oxycodone given for pain. Tolerating ambulation with walker. Patient assisted with repositioning in bed for comfort. No BMs this shift. Ice pack in place on surgical site. Patient using pillow to guard abdomen. Call light and belongings within reach. Patient able to make needs known. Will continue to monitor.     Electronically signed by Pratik Hamlin RN on 11/8/2020 at 6:27 PM

## 2020-11-08 NOTE — PROGRESS NOTES
Pt Name: Indu Anders  Medical Record Number: [id number]  Date of Birth 1940   Today's Date: 11/8/2020      Subjective:  She received 1 unit of blood yesterday. Vitals remain stable. She continues to complain of left sided abdominal pain. No fever. ROS: Constitutional: No fever    Vitals:  Vitals:    11/07/20 1926 11/07/20 2344 11/08/20 0314 11/08/20 0826   BP: 118/70 138/76 136/74 127/67   Pulse: 91 80 78 81   Resp: 16 16 17 14   Temp: 98.3 °F (36.8 °C) 97.9 °F (36.6 °C) 97.9 °F (36.6 °C) 98.1 °F (36.7 °C)   TempSrc: Oral Oral Oral Oral   SpO2: 98% 95% 96% 96%   Weight:   187 lb 2.7 oz (84.9 kg)    Height:         I/O last 3 completed shifts:   In: 898.8 [P.O.:420; Blood:478.8]  Out: 700 [Urine:700]    Exam:  General: Awake, oriented, no acute distress  Respiratory: Nonlabored breathing  Abdomen: Soft, appropriately tender, non-distended, no masses, incision ok- staples intact  Skin: Skin color, texture, turgor normal, no rashes or lesions  Neurologic: no gross deficits    CURRENT MEDICATIONS   Scheduled Meds:   pregabalin  150 mg Oral BID    fluticasone  2 spray Nasal Daily    nortriptyline  10 mg Oral Nightly    pantoprazole  40 mg Oral BID AC    sodium chloride flush  10 mL Intravenous 2 times per day    bisacodyl  5 mg Oral Daily    insulin lispro  0-6 Units Subcutaneous TID     insulin lispro  0-3 Units Subcutaneous Nightly     Continuous Infusions:   dextrose       PRN Meds:.acetaminophen, morphine, oxyCODONE, sodium chloride flush, promethazine **OR** ondansetron, glucose, dextrose, glucagon (rDNA), dextrose    LABS     Recent Labs     11/06/20  0444 11/07/20  0510 11/07/20  1940/20  1001   WBC 14.0* 11.7* 11.1* 11.9*   HGB 7.7* 6.9* 8.0* 8.5*   HCT 23.1* 20.9* 24.3* 25.3*    179 197 234   *  --   --   --    K 4.0  --   --   --      --   --   --    CO2 20*  --   --   --    BUN 24*  --   --   --    CREATININE 1.7*  --   --   --    CALCIUM 8.1*  --   -- --            [facility] day # 5  1. Left open partial nephrectomy  2. Anemia due to operative blood loss  3. Post operative pain  PLAN   1. hgb stable this am- repeat tomorrow  2.  Continue PT- will likely need rehab facility on discharge    Zeb Kelley MD 11/8/2020 10:28 AM

## 2020-11-09 LAB
GLUCOSE BLD-MCNC: 121 MG/DL (ref 70–99)
GLUCOSE BLD-MCNC: 128 MG/DL (ref 70–99)
GLUCOSE BLD-MCNC: 174 MG/DL (ref 70–99)
GLUCOSE BLD-MCNC: 248 MG/DL (ref 70–99)
HCT VFR BLD CALC: 21.5 % (ref 36–48)
HEMOGLOBIN: 7.4 G/DL (ref 12–16)
MCH RBC QN AUTO: 30.1 PG (ref 26–34)
MCHC RBC AUTO-ENTMCNC: 34.3 G/DL (ref 31–36)
MCV RBC AUTO: 87.8 FL (ref 80–100)
PDW BLD-RTO: 14.4 % (ref 12.4–15.4)
PERFORMED ON: ABNORMAL
PLATELET # BLD: 198 K/UL (ref 135–450)
PMV BLD AUTO: 8.5 FL (ref 5–10.5)
RBC # BLD: 2.45 M/UL (ref 4–5.2)
WBC # BLD: 8.2 K/UL (ref 4–11)

## 2020-11-09 PROCEDURE — 1200000000 HC SEMI PRIVATE

## 2020-11-09 PROCEDURE — 6370000000 HC RX 637 (ALT 250 FOR IP): Performed by: UROLOGY

## 2020-11-09 PROCEDURE — 97116 GAIT TRAINING THERAPY: CPT

## 2020-11-09 PROCEDURE — 85027 COMPLETE CBC AUTOMATED: CPT

## 2020-11-09 PROCEDURE — 97535 SELF CARE MNGMENT TRAINING: CPT

## 2020-11-09 PROCEDURE — 36415 COLL VENOUS BLD VENIPUNCTURE: CPT

## 2020-11-09 PROCEDURE — 2580000003 HC RX 258: Performed by: UROLOGY

## 2020-11-09 PROCEDURE — 97530 THERAPEUTIC ACTIVITIES: CPT

## 2020-11-09 RX ADMIN — PANTOPRAZOLE SODIUM 40 MG: 40 TABLET, DELAYED RELEASE ORAL at 17:16

## 2020-11-09 RX ADMIN — INSULIN LISPRO 2 UNITS: 100 INJECTION, SOLUTION INTRAVENOUS; SUBCUTANEOUS at 11:55

## 2020-11-09 RX ADMIN — NORTRIPTYLINE HYDROCHLORIDE 10 MG: 10 CAPSULE ORAL at 21:03

## 2020-11-09 RX ADMIN — PANTOPRAZOLE SODIUM 40 MG: 40 TABLET, DELAYED RELEASE ORAL at 08:34

## 2020-11-09 RX ADMIN — PREGABALIN 150 MG: 75 CAPSULE ORAL at 21:03

## 2020-11-09 RX ADMIN — OXYCODONE HYDROCHLORIDE 10 MG: 10 TABLET ORAL at 21:03

## 2020-11-09 RX ADMIN — Medication 10 ML: at 08:36

## 2020-11-09 RX ADMIN — OXYCODONE HYDROCHLORIDE 10 MG: 10 TABLET ORAL at 08:44

## 2020-11-09 RX ADMIN — INSULIN LISPRO 1 UNITS: 100 INJECTION, SOLUTION INTRAVENOUS; SUBCUTANEOUS at 21:03

## 2020-11-09 RX ADMIN — BISACODYL 5 MG: 5 TABLET, COATED ORAL at 08:35

## 2020-11-09 RX ADMIN — PREGABALIN 150 MG: 75 CAPSULE ORAL at 08:34

## 2020-11-09 RX ADMIN — OXYCODONE HYDROCHLORIDE 10 MG: 10 TABLET ORAL at 13:26

## 2020-11-09 RX ADMIN — ACETAMINOPHEN 650 MG: 325 TABLET ORAL at 03:11

## 2020-11-09 RX ADMIN — Medication 10 ML: at 21:08

## 2020-11-09 RX ADMIN — OXYCODONE HYDROCHLORIDE 10 MG: 10 TABLET ORAL at 03:11

## 2020-11-09 RX ADMIN — FLUTICASONE PROPIONATE 2 SPRAY: 50 SPRAY, METERED NASAL at 08:36

## 2020-11-09 ASSESSMENT — PAIN DESCRIPTION - ORIENTATION
ORIENTATION: LEFT

## 2020-11-09 ASSESSMENT — PAIN SCALES - GENERAL
PAINLEVEL_OUTOF10: 6
PAINLEVEL_OUTOF10: 0
PAINLEVEL_OUTOF10: 5
PAINLEVEL_OUTOF10: 0
PAINLEVEL_OUTOF10: 6
PAINLEVEL_OUTOF10: 6
PAINLEVEL_OUTOF10: 3
PAINLEVEL_OUTOF10: 6
PAINLEVEL_OUTOF10: 5
PAINLEVEL_OUTOF10: 0

## 2020-11-09 ASSESSMENT — PAIN DESCRIPTION - DESCRIPTORS
DESCRIPTORS: ACHING;CONSTANT

## 2020-11-09 ASSESSMENT — PAIN DESCRIPTION - ONSET
ONSET: ON-GOING

## 2020-11-09 ASSESSMENT — PAIN DESCRIPTION - FREQUENCY
FREQUENCY: CONTINUOUS

## 2020-11-09 ASSESSMENT — PAIN SCALES - WONG BAKER
WONGBAKER_NUMERICALRESPONSE: 0
WONGBAKER_NUMERICALRESPONSE: 2
WONGBAKER_NUMERICALRESPONSE: 2
WONGBAKER_NUMERICALRESPONSE: 0
WONGBAKER_NUMERICALRESPONSE: 0

## 2020-11-09 ASSESSMENT — PAIN DESCRIPTION - PAIN TYPE
TYPE: ACUTE PAIN;SURGICAL PAIN

## 2020-11-09 ASSESSMENT — PAIN DESCRIPTION - PROGRESSION
CLINICAL_PROGRESSION: NOT CHANGED
CLINICAL_PROGRESSION: GRADUALLY IMPROVING
CLINICAL_PROGRESSION: NOT CHANGED
CLINICAL_PROGRESSION: NOT CHANGED

## 2020-11-09 ASSESSMENT — PAIN DESCRIPTION - LOCATION
LOCATION: FLANK

## 2020-11-09 NOTE — PROGRESS NOTES
Physical Therapy  Facility/Department: [facility] 3 ORTHOPEDICS  Daily Treatment Note  NAME: Mare Guevara  : 1940  MRN: [id number]    Date of Service: 2020  Mare Guevara scored a 19/24 on the AM-PAC short mobility form. Current research shows that an AM-PAC score of 18 or greater is typically associated with a discharge to the patient's home setting. Based on the patient's AM-PAC score and their current functional mobility deficits, it is recommended that the patient have 2-3 sessions per week of Physical Therapy at d/c to increase the patient's independence. At this time, this patient demonstrates the endurance and safety to discharge home with Home PT and a follow up treatment frequency of 2-3x/wk. Please see assessment section for further patient specific details. If patient discharges prior to next session this note will serve as a discharge summary. Please see below for the latest assessment towards goals. HOME HEALTH CARE: LEVEL 1 STANDARD    - Initial home health evaluation to occur within 24-48 hours, in patient home   - Therapy to evaluate with goal of regaining prior level of functioning   - Therapy to evaluate if patient has [address] needs for personal care  Discharge Recommendations:  2-3 sessions per week   PT Equipment Recommendations  Equipment Needed: No    Assessment   Assessment: Pt is an 78 y.o. F. admitted 11/3 for neoplasm, underwent partial L nephrectomy. She presents in pain, and with decreased activity tolerance. Status 2020: SBA for functional mobility. Able to ambulate x 140' with FWW  with SBA slow and guarded d/t  pain. She would benefit from continued skilled therapy to maximize tolerance, independence, and safety of functional mobility, prior to returning home with dtr as prior. Pt. has progressed since last session and anticipate Pt. will be able to return home with 2-3 sessions/wk.   Will cont to see while in the hospital.  Treatment Diagnosis: Impaired functional mobility  Prognosis: Good  PT Education: Goals; General Safety;Gait Training;Plan of Care;PT Role;Home Exercise Program;Functional Mobility Training;Transfer Training  Patient Education: Pt. verbalized understanding  Barriers to Learning: pain  REQUIRES PT FOLLOW UP: Yes  Activity Tolerance  Activity Tolerance: Patient limited by pain; Patient limited by fatigue     Patient Diagnosis(es): The primary encounter diagnosis was Encounter for preadmission testing. A diagnosis of Neoplasm of uncertain behavior of left kidney was also pertinent to this visit. has a past medical history of Atrial fibrillation ([facility] [address] Diabetes mellitus ([facility] [address] Hyperlipidemia, Hypertension, Neuropathy, Pacemaker, and Renal cancer ([facility] [address] has a past surgical history that includes Tonsillectomy; Cholecystectomy; pacemaker placement; Colonoscopy; and partial nephrectomy (Left, 11/3/2020). Restrictions  Restrictions/Precautions  Restrictions/Precautions: Fall Risk  Position Activity Restriction  Other position/activity restrictions: IVF R UE  1-7-2020: to recieve 1 unit PRBCs  Subjective   General  Chart Reviewed: Yes  Additional Pertinent Hx: 77 yo female found to have renal cell CA, underwent L open partial nephrectomy via Dr. Maria R Jones 11-3-2020. 11-7-2020: to receive 1 unit PRBCs. Other PMHx as noted. Response To Previous Treatment: Patient with no complaints from previous session. Family / Caregiver Present: No  Referring Practitioner: Dr. Blanca Frey  Subjective  Subjective: Sitting up in chair, reports just received pain meds and is sleepy. Pain 7/10 L Lateral mid abdomen. Orientation  Orientation  Overall Orientation Status: Within Functional Limits  Cognition      Objective   Bed mobility  Supine to Sit: Unable to assess  Scooting: Unable to assess  Comment: Up in chair pre/post session. Reviewed process of rolling and side to /from sit in case Pt. goes home.   Pt. states was able to do it but slowly  Transfers  Sit to Stand: Stand by assistance  Stand to sit: Stand by assistance  Ambulation  Ambulation?: Yes  Ambulation 1  Surface: level tile  Device: Rolling Walker  Assistance: Stand by assistance  Quality of Gait: Pt ambulated slowly with walker, moving with step-through pattern, increased weight through UE's would reduce this if v.cues provided but then returned to increased UE weigth.  mild forward flexed posture, encouraged to upright trunk  Gait Deviations: Slow Taylor;Decreased step length;Decreased step height  Distance: 140' x 1 and 20' x 1  Stairs/Curb  Stairs?: No     Balance  Posture: Good  Sitting - Static: Good  Sitting - Dynamic: Good  Standing - Static: Good  Standing - Dynamic: Good  Comments: Stood at the sink x 8-10 minutes to wash face and brush teeth with walker and SBA. 2-3 sways with fatigues but able to steady self Pt. then took a brief sitting rest break before ambulating                                                                                AM-PAC Score  AM-PAC Inpatient Mobility Raw Score : 19 (11/09/20 1205)  AM-PAC Inpatient T-Scale Score : 45.44 (11/09/20 1205)  Mobility Inpatient CMS 0-100% Score: 41.77 (11/09/20 1205)  Mobility Inpatient CMS G-Code Modifier : CK (11/09/20 1205)          Goals  Short term goals  Time Frame for Short term goals: In 2-3 days pt will perform. 11-7-2020: all goals ongoing  Short term goal 1: Bed mobility CGA  Short term goal 2: Transfers CGA - Goal met 11/9 - increase to Mod I  Short term goal 3: Ambulation 48' with RW, CGA - Goal met 11/9 - increased to Mod I x 150'  Short term goal 4: Ascend/Descend 3 steps with CGA - ongoing 11/9  Long term goals  Time Frame for Long term goals : LTG = STG  Patient Goals   Patient goals :  To return directly home    Plan    Plan  Times per week: 3-5  Specific instructions for Next Treatment: Improve strength, balance, endurance  Current Treatment Recommendations: Functional Mobility Training, Gait

## 2020-11-09 NOTE — CARE COORDINATION
Mike spoke with patient and daughter present in room regarding dc plan and IMM letter. They confirmed patient would dc home with daughter and [address]. Patient has the dme she needs.      Mike spoke with [facility] liaison regarding referral.    Electronically signed by OLGA Montemayor on 11/9/2020 at 2:49 PM

## 2020-11-09 NOTE — PROGRESS NOTES
Pt up to bathroom with rolling walker. Tolerated activity well. Pt returns to bed and positioned for comfort. Shift assessment and PM medication complete. Pt complains of mild pain to left flank. PRN tylenol given per order. No further needs voiced. Call light in reach. Will continue to monitor.

## 2020-11-09 NOTE — PROGRESS NOTES
Pt Name: Alisia Lopez  Medical Record Number: [id number]  Date of Birth 1940   Today's Date: 11/9/2020      Subjective:  She received 1 unit of blood Saturday. H/H 7.4/21.5 today . Vitals remain stable. Ambulating much better today. No fever. ROS: Constitutional: No fever    Vitals:  Vitals:    11/08/20 1934 11/08/20 2336 11/09/20 0401 11/09/20 0741   BP: (!) 154/78 (!) 148/73 132/71 122/70   Pulse: 73 80 80 71   Resp: 17 16 17 12   Temp: 98.1 °F (36.7 °C) 98.3 °F (36.8 °C) 97.8 °F (36.6 °C) 97.8 °F (36.6 °C)   TempSrc: Oral Oral Oral Oral   SpO2: 94% 95% 91% 95%   Weight:   187 lb 9.8 oz (85.1 kg)    Height:         I/O last 3 completed shifts: In: 840 [P.O.:840]  Out: -     Exam:  General: Awake, oriented, no acute distress  Respiratory: Nonlabored breathing  Abdomen: Soft, appropriately tender, non-distended, no masses, incision ok- staples intact  Skin: Skin color, texture, turgor normal, no rashes or lesions  Neurologic: no gross deficits    CURRENT MEDICATIONS   Scheduled Meds:   pregabalin  150 mg Oral BID    fluticasone  2 spray Nasal Daily    nortriptyline  10 mg Oral Nightly    pantoprazole  40 mg Oral BID AC    sodium chloride flush  10 mL Intravenous 2 times per day    bisacodyl  5 mg Oral Daily    insulin lispro  0-6 Units Subcutaneous TID     insulin lispro  0-3 Units Subcutaneous Nightly     Continuous Infusions:   dextrose       PRN Meds:.acetaminophen, morphine, oxyCODONE, sodium chloride flush, promethazine **OR** ondansetron, glucose, dextrose, glucagon (rDNA), dextrose    LABS     Recent Labs     11/1940 11/08/20  1001 11/09/20  0823   WBC 11.1* 11.9* 8.2   HGB 8.0* 8.5* 7.4*   HCT 24.3* 25.3* 21.5*    234 198   NA  --  135*  --    K  --  4.0  --    CL  --  104  --    CO2  --  19*  --    BUN  --  21*  --    CREATININE  --  2.0*  --    CALCIUM  --  8.7  --            ASSESSMENT   Hospital day # 6  1. Left open partial nephrectomy  2.  Anemia due to operative blood loss  3. Post operative pain  PLAN   1. hgb stable this am- repeat tomorrow  2. Continue PT- mobility improving today.     GAGE Mckeon - CNP 11/9/2020 11:45 AM

## 2020-11-09 NOTE — PLAN OF CARE
Problem: Falls - Risk of:  Goal: Will remain free from falls  Description: Will remain free from falls  11/9/2020 1028 by Boby Tavares RN  Outcome: Ongoing   Will remain free from falls. Fall precautions are in place. Call light, telephone and bedside table are within reach. Problem: Falls - Risk of:  Goal: Absence of physical injury  Description: Absence of physical injury  Outcome: Ongoing     Problem: Skin Integrity:  Goal: Will show no infection signs and symptoms  Description: Will show no infection signs and symptoms  11/9/2020 1028 by Boby Tavares RN  Outcome: Ongoing     Problem: Skin Integrity:  Goal: Absence of new skin breakdown  Description: Absence of new skin breakdown  Outcome: Ongoing     Problem: Pain:  Goal: Pain level will decrease  Description: Pain level will decrease  11/9/2020 1028 by Boby Tavares RN  Outcome: Ongoing   Pain level will decrease  Problem: Pain:  Goal: Control of acute pain  Description: Control of acute pain  Outcome: Ongoing   Patient educated on acute pain. Taught patient to use call light to ask for pain medication. PRN pain medication given for acute pain. Will continue to monitor pain per unit protocol.     Problem: Pain:  Goal: Control of chronic pain  Description: Control of chronic pain  Outcome: Ongoing

## 2020-11-09 NOTE — PROGRESS NOTES
Patient is A&O. Patient is resting in chair, awake and quiet. Room air. Chair alarm on. Call light, telephone and bedside table are within reach. VSS taken. AM meds given. Shift assessment completed. Will continue to monitor patient per unit protocols.  Electronically signed by Anirudh Horvath RN on 11/9/2020 at 10:52 AM

## 2020-11-09 NOTE — PLAN OF CARE
Problem: Falls - Risk of:  Goal: Will remain free from falls  Description: Will remain free from falls  Note: Pt remains free from falls this shift. Fall precautions in place. Will continue to monitor. Problem: Skin Integrity:  Goal: Will show no infection signs and symptoms  Description: Will show no infection signs and symptoms  Note: Pt will show no signs or symptoms of infection this shift. Problem: Pain:  Goal: Pain level will decrease  Description: Pain level will decrease  Note: Pt will report a decrease in pain this shift. Pain will be assessed frequently and treated per order.

## 2020-11-09 NOTE — PROGRESS NOTES
Occupational Therapy  Facility/Department: [address] ORTHOPEDICS  Daily Treatment Note  NAME: Mirella Heller  : 1940  MRN: [id number]    Date of Service: 2020    Discharge Recommendations:  Patient would benefit from continued therapy after discharge, Continue to assess pending progress, 2-3 sessions per week       Assessment   Discussed w/OTR; pt agreeable to therapy w/prompting. PT sit <>stand from recliner to RW SBA. Func mob to bathroom w/ RW SBA. Pt completed grooming while standing at sink 8-10 min SBA. Pt initated seated rest break. Pt completed func mob down hallway and back to room w/ RW w/SBA, pt inititated standing rest breaks due to pain reported 7/10. Pt stand <>sit SBA from RW to recliner. Reccomend continued skilled OT and continued home therapy at d/c due to low endurance w/ func mob and ADL tasks. Mirella Heller scored a 19/24 on the AM-PAC ADL Inpatient form. Current research shows that an AM-PAC score of 18 or greater is typically associated with a discharge to the patient's home setting. Based on the patient's AM-PAC score, and their current ADL deficits, it is recommended that the patient have 2-3 sessions per week of Occupational Therapy at d/c to increase the patient's independence. At this time, this patient demonstrates the endurance and safety to discharge home with home(home vs OP services) and a follow up treatment frequency of 2-3x/wk. Please see assessment section for further patient specific details. If patient discharges prior to next session this note will serve as a discharge summary. Please see below for the latest assessment towards goals. Patient Diagnosis(es): The primary encounter diagnosis was Encounter for preadmission testing. A diagnosis of Neoplasm of uncertain behavior of left kidney was also pertinent to this visit.       has a past medical history of Atrial fibrillation ([address] Diabetes mellitus ([address] Hyperlipidemia, Hypertension, Neuropathy, bathroom w/ RW SBA. Pt completed grooming while standing at sink 8-10 min SBA. Pt initated seated rest break. Pt completed func mob down hallway and back to room w/ RW w/SBA, pt inititated standing rest breaks due to pain reported 7/10. Pt stand <>sit SBA from RW to recliner. Reccomend continued skilled OT and continued home therapy at d/c due to low endurance w/ func mob and ADL tasks. Prognosis: Good  OT Education: ADL Adaptive Strategies;Transfer Training  Patient Education: Educated pt on safet transfers and func mob  REQUIRES OT FOLLOW UP: Yes  Activity Tolerance  Activity Tolerance: Patient Tolerated treatment well;Patient limited by pain  Activity Tolerance: Pt tolerated session well, limited due to L flank pain  Safety Devices  Safety Devices in place: Yes  Type of devices: Left in chair;Nurse notified;Call light within reach; Chair alarm in place;Gait belt         Plan   Plan  Times per week: 3-5  Times per day: Daily  Plan weeks: 1-3 more day  Specific instructions for Next Treatment: co-tx in PM for further ambulation/OOB act  Current Treatment Recommendations: Strengthening, Patient/Caregiver Education & Training, Home Management Training, Equipment Evaluation, Education, & procurement, Balance Training, Functional Mobility Training, Endurance Training, Safety Education & Training, Self-Care / ADL, Pain Management  Plan Comment: recommend rehab    AM-PAC Score        AM-[facility] Inpatient Daily Activity Raw Score: 19 (11/09/20 1204)  AM-PAC Inpatient ADL T-Scale Score : 40.22 (11/09/20 1204)  ADL Inpatient CMS 0-100% Score: 42.8 (11/09/20 1204)  ADL Inpatient CMS G-Code Modifier : CK (11/09/20 1204)    Goals  Short term goals  Time Frame for Short term goals: 1 week  Short term goal 1: Pt will be mod I with functional transfers  Short term goal 2: Pt will be mod I with functional mobility  Short term goal 3: Pt will be mod I with toileting  Short term goal 4: Pt will be mod I with bathing and dressing  Short term goal 5: Pt will be mod I with bed mobility  Long term goals  Time Frame for Long term goals : STG = LTG  Patient Goals   Patient goals : Pt wants to be home with daughter       Therapy Time   Individual Concurrent Group Co-treatment   Time In 1120         Time Out 1153         Minutes UlMichael Turpin, S/BETHANY  Electronically signed by Lisa Smith on 11/9/2020 at 12:06 PM     Electronically signed by Girma Anderson, [id number] on 11/9/2020 at 1:33 PM      I attest that I was present for and made a skilled and mindful clinical judgement during the treatment of this patient 11/9/2020

## 2020-11-10 VITALS
SYSTOLIC BLOOD PRESSURE: 137 MMHG | DIASTOLIC BLOOD PRESSURE: 87 MMHG | OXYGEN SATURATION: 94 % | WEIGHT: 187.61 LBS | TEMPERATURE: 98.4 F | RESPIRATION RATE: 16 BRPM | BODY MASS INDEX: 31.26 KG/M2 | HEIGHT: 65 IN | HEART RATE: 82 BPM

## 2020-11-10 LAB
ANION GAP SERPL CALCULATED.3IONS-SCNC: 11 MMOL/L (ref 3–16)
BUN BLDV-MCNC: 17 MG/DL (ref 7–20)
CALCIUM SERPL-MCNC: 8.7 MG/DL (ref 8.3–10.6)
CHLORIDE BLD-SCNC: 107 MMOL/L (ref 99–110)
CO2: 22 MMOL/L (ref 21–32)
CREAT SERPL-MCNC: 1.9 MG/DL (ref 0.6–1.2)
GFR AFRICAN AMERICAN: 31
GFR NON-AFRICAN AMERICAN: 25
GLUCOSE BLD-MCNC: 115 MG/DL (ref 70–99)
GLUCOSE BLD-MCNC: 116 MG/DL (ref 70–99)
GLUCOSE BLD-MCNC: 151 MG/DL (ref 70–99)
HCT VFR BLD CALC: 22.1 % (ref 36–48)
HEMOGLOBIN: 7.6 G/DL (ref 12–16)
MCH RBC QN AUTO: 30.4 PG (ref 26–34)
MCHC RBC AUTO-ENTMCNC: 34.5 G/DL (ref 31–36)
MCV RBC AUTO: 88.1 FL (ref 80–100)
PDW BLD-RTO: 14.5 % (ref 12.4–15.4)
PERFORMED ON: ABNORMAL
PERFORMED ON: ABNORMAL
PLATELET # BLD: 234 K/UL (ref 135–450)
PMV BLD AUTO: 8.7 FL (ref 5–10.5)
POTASSIUM SERPL-SCNC: 4 MMOL/L (ref 3.5–5.1)
RBC # BLD: 2.51 M/UL (ref 4–5.2)
SODIUM BLD-SCNC: 140 MMOL/L (ref 136–145)
WBC # BLD: 9.4 K/UL (ref 4–11)

## 2020-11-10 PROCEDURE — 85027 COMPLETE CBC AUTOMATED: CPT

## 2020-11-10 PROCEDURE — 94760 N-INVAS EAR/PLS OXIMETRY 1: CPT

## 2020-11-10 PROCEDURE — 2580000003 HC RX 258: Performed by: UROLOGY

## 2020-11-10 PROCEDURE — 6370000000 HC RX 637 (ALT 250 FOR IP): Performed by: UROLOGY

## 2020-11-10 PROCEDURE — 80048 BASIC METABOLIC PNL TOTAL CA: CPT

## 2020-11-10 PROCEDURE — 36415 COLL VENOUS BLD VENIPUNCTURE: CPT

## 2020-11-10 RX ORDER — OXYCODONE HYDROCHLORIDE 5 MG/1
5 TABLET ORAL EVERY 6 HOURS PRN
Qty: 20 TABLET | Refills: 0 | Status: SHIPPED | OUTPATIENT
Start: 2020-11-10 | End: 2020-11-15

## 2020-11-10 RX ADMIN — ACETAMINOPHEN 650 MG: 325 TABLET ORAL at 12:30

## 2020-11-10 RX ADMIN — BISACODYL 5 MG: 5 TABLET, COATED ORAL at 09:07

## 2020-11-10 RX ADMIN — INSULIN LISPRO 1 UNITS: 100 INJECTION, SOLUTION INTRAVENOUS; SUBCUTANEOUS at 12:19

## 2020-11-10 RX ADMIN — PANTOPRAZOLE SODIUM 40 MG: 40 TABLET, DELAYED RELEASE ORAL at 06:59

## 2020-11-10 RX ADMIN — PREGABALIN 150 MG: 75 CAPSULE ORAL at 09:07

## 2020-11-10 RX ADMIN — ACETAMINOPHEN 650 MG: 325 TABLET ORAL at 03:43

## 2020-11-10 RX ADMIN — OXYCODONE HYDROCHLORIDE 10 MG: 10 TABLET ORAL at 09:07

## 2020-11-10 RX ADMIN — Medication 10 ML: at 09:08

## 2020-11-10 ASSESSMENT — PAIN DESCRIPTION - PROGRESSION
CLINICAL_PROGRESSION: GRADUALLY IMPROVING
CLINICAL_PROGRESSION: GRADUALLY IMPROVING
CLINICAL_PROGRESSION: NOT CHANGED

## 2020-11-10 ASSESSMENT — PAIN - FUNCTIONAL ASSESSMENT
PAIN_FUNCTIONAL_ASSESSMENT: PREVENTS OR INTERFERES SOME ACTIVE ACTIVITIES AND ADLS
PAIN_FUNCTIONAL_ASSESSMENT: PREVENTS OR INTERFERES WITH MANY ACTIVE NOT PASSIVE ACTIVITIES
PAIN_FUNCTIONAL_ASSESSMENT: PREVENTS OR INTERFERES SOME ACTIVE ACTIVITIES AND ADLS
PAIN_FUNCTIONAL_ASSESSMENT: PREVENTS OR INTERFERES WITH MANY ACTIVE NOT PASSIVE ACTIVITIES
PAIN_FUNCTIONAL_ASSESSMENT: PREVENTS OR INTERFERES SOME ACTIVE ACTIVITIES AND ADLS

## 2020-11-10 ASSESSMENT — PAIN DESCRIPTION - FREQUENCY
FREQUENCY: CONTINUOUS

## 2020-11-10 ASSESSMENT — PAIN DESCRIPTION - DESCRIPTORS
DESCRIPTORS: ACHING;BURNING
DESCRIPTORS: ACHING;CRAMPING
DESCRIPTORS: ACHING;CONSTANT
DESCRIPTORS: ACHING
DESCRIPTORS: ACHING;CONSTANT

## 2020-11-10 ASSESSMENT — PAIN DESCRIPTION - LOCATION
LOCATION: FLANK

## 2020-11-10 ASSESSMENT — PAIN SCALES - GENERAL
PAINLEVEL_OUTOF10: 4
PAINLEVEL_OUTOF10: 0
PAINLEVEL_OUTOF10: 4
PAINLEVEL_OUTOF10: 5
PAINLEVEL_OUTOF10: 2
PAINLEVEL_OUTOF10: 4

## 2020-11-10 ASSESSMENT — PAIN DESCRIPTION - ONSET
ONSET: ON-GOING
ONSET: GRADUAL
ONSET: GRADUAL
ONSET: ON-GOING
ONSET: ON-GOING

## 2020-11-10 ASSESSMENT — PAIN DESCRIPTION - ORIENTATION
ORIENTATION: LEFT

## 2020-11-10 ASSESSMENT — PAIN SCALES - WONG BAKER
WONGBAKER_NUMERICALRESPONSE: 2
WONGBAKER_NUMERICALRESPONSE: 0
WONGBAKER_NUMERICALRESPONSE: 0
WONGBAKER_NUMERICALRESPONSE: 4
WONGBAKER_NUMERICALRESPONSE: 6
WONGBAKER_NUMERICALRESPONSE: 4

## 2020-11-10 ASSESSMENT — PAIN DESCRIPTION - PAIN TYPE
TYPE: ACUTE PAIN;SURGICAL PAIN

## 2020-11-10 NOTE — CARE COORDINATION
Sw aware of patient's dc and recommendation for home care. Sw informed [facility] liaison of dc. Mike contacted patient's attending office for signed [address] and home care order.      Electronically signed by Gabi Tyson on 11/10/2020 at 9:18 AM

## 2020-11-10 NOTE — PROGRESS NOTES
Pt rounded on this morning Q2h, whiteboard updated, pt given ice water and needs assessed. Pt c/o severe abdominal pain mostly on the Left side, and using pillow to splint. Nursing student at bedside, administering medications and PRN analgesic (see eMAR). Pt has no needs or concerns at this time, hoping to discharge to home with home care today. Call light within reach, pt verbalized understanding to call prior to ambulating. Will continue to monitor and reassess.    Electronically signed by Sue Goodwin RN on 11/10/2020 at 10:01 AM

## 2020-11-10 NOTE — PROGRESS NOTES
Pt discharged to home. Transportation here with wheelchair. Accompanied by daughter. Transported in personal vehicle. Discharge instructions, Rx, and personal belongings given to pt. Explanation of discharge medications and instructions understood by verbal statement. No questions, comments or concerns at this time.    Electronically signed by Kimberli Ordoñez RN on 11/10/2020 at 1:28 PM

## 2020-11-10 NOTE — CARE COORDINATION
[facility] aware of discharge. [facility] to call daughter to schedule visits. Patient to be seen by 11/12. [facility]. Electronically signed by Vonda Franklin LPN on 07/22/13 at 2:01 AM EST

## 2020-11-10 NOTE — PLAN OF CARE
Problem: Falls - Risk of:  Goal: Will remain free from falls  Description: Will remain free from falls  11/9/2020 2247 by Vero Bryan RN  Note: Pt remains free from falls this shift. Fall precautions in place. Will continue to monitor. 11/9/2020 1028 by Skyler Soni RN  Outcome: Ongoing  Goal: Absence of physical injury  Description: Absence of physical injury  11/9/2020 1028 by Skyler Soni RN  Outcome: Ongoing     Problem: Skin Integrity:  Goal: Will show no infection signs and symptoms  Description: Will show no infection signs and symptoms  11/9/2020 2247 by Vero Bryan RN  Note: Pt will show no signs or symptoms of infection this shift. 11/9/2020 1028 by Skyler Soni RN  Outcome: Ongoing  Goal: Absence of new skin breakdown  Description: Absence of new skin breakdown  11/9/2020 1028 by Skyler Soni RN  Outcome: Ongoing     Problem: Pain:  Goal: Pain level will decrease  Description: Pain level will decrease  11/9/2020 2247 by Vero Bryan RN  Note: Pt will report a decrease in pain this shift. Pain will be assessed frequently and treated per order.    11/9/2020 1028 by Skyler Soni RN  Outcome: Ongoing  Goal: Control of acute pain  Description: Control of acute pain  11/9/2020 1028 by Skyler Soni RN  Outcome: Ongoing  Goal: Control of chronic pain  Description: Control of chronic pain  11/9/2020 1028 by Skyler Soni RN  Outcome: Ongoing

## 2020-11-10 NOTE — PLAN OF CARE
Problem: Falls - Risk of:  Goal: Will remain free from falls  Description: Will remain free from falls  11/10/2020 0744 by Jennifer Leon RN  Outcome: Ongoing  11/9/2020 2247 by Deneen Auguste RN  Note: Pt remains free from falls this shift. Fall precautions in place. Will continue to monitor. Goal: Absence of physical injury  Description: Absence of physical injury  Outcome: Ongoing     Problem: Skin Integrity:  Goal: Will show no infection signs and symptoms  Description: Will show no infection signs and symptoms  11/10/2020 0744 by Jennifer Leon RN  Outcome: Ongoing  11/9/2020 2247 by Deneen Auguste RN  Note: Pt will show no signs or symptoms of infection this shift. Goal: Absence of new skin breakdown  Description: Absence of new skin breakdown  Outcome: Ongoing     Problem: Pain:  Goal: Pain level will decrease  Description: Pain level will decrease  11/10/2020 0744 by Jennifer Leon RN  Outcome: Ongoing  11/9/2020 2247 by Deneen Auguste RN  Note: Pt will report a decrease in pain this shift. Pain will be assessed frequently and treated per order.    Goal: Control of acute pain  Description: Control of acute pain  Outcome: Ongoing  Goal: Control of chronic pain  Description: Control of chronic pain  Outcome: Ongoing

## 2020-11-10 NOTE — PROGRESS NOTES
Pt assisted to bathroom with front wheel walker and back to bed, pt tolerated activity fairly well. Pt using pillow to splint left flank. Shift assessment complete and PM medications given per order. Pt request PRN pain medication at this time. PRN pain medication given per order. Surgical incision observed, BETHANY and dry. Pt repositioned in bed for comfort. No further needs voiced. Call light and personal belongings in reach. Bed alarm on. Will continue to monitor.

## 2020-11-10 NOTE — PROGRESS NOTES
Pt Name: Indu Anders  Medical Record Number: [id number]  Date of Birth 1940   Today's Date: 11/10/2020      Subjective:  She received 1 unit of blood Saturday. H/H 7.6/22.1 today . Vitals remain stable. Continues to ambulate better. Pain controlled. No fever. ROS: Constitutional: No fever    Vitals:  Vitals:    11/09/20 1924 11/09/20 2345 11/10/20 0451 11/10/20 0745   BP: 134/70 111/66 123/77 137/87   Pulse: 82 79 81 82   Resp: 17 16 17 16   Temp: 98 °F (36.7 °C) 98 °F (36.7 °C) 98.7 °F (37.1 °C) 98.4 °F (36.9 °C)   TempSrc: Oral Oral Oral Oral   SpO2: 96% 92% 92%    Weight:       Height:         I/O last 3 completed shifts: In: 1020 [P.O.:1020]  Out: -     Exam:  General: Awake, oriented, no acute distress  Respiratory: Nonlabored breathing  Abdomen: Soft, appropriately tender, non-distended, no masses, incision ok- staples intact  Skin: Skin color, texture, turgor normal, no rashes or lesions  Neurologic: no gross deficits    CURRENT MEDICATIONS   Scheduled Meds:   pregabalin  150 mg Oral BID    fluticasone  2 spray Nasal Daily    nortriptyline  10 mg Oral Nightly    pantoprazole  40 mg Oral BID AC    sodium chloride flush  10 mL Intravenous 2 times per day    bisacodyl  5 mg Oral Daily    insulin lispro  0-6 Units Subcutaneous TID     insulin lispro  0-3 Units Subcutaneous Nightly     Continuous Infusions:   dextrose       PRN Meds:.acetaminophen, morphine, oxyCODONE, sodium chloride flush, promethazine **OR** ondansetron, glucose, dextrose, glucagon (rDNA), dextrose    LABS     Recent Labs     11/08/20  1001 11/09/20  0823 11/10/20  0534   WBC 11.9* 8.2 9.4   HGB 8.5* 7.4* 7.6*   HCT 25.3* 21.5* 22.1*    198 234   *  --  140   K 4.0  --  4.0     --  107   CO2 19*  --  22   BUN 21*  --  17   CREATININE 2.0*  --  1.9*   CALCIUM 8.7  --  8.7           ASSESSMENT   Hospital day # 7  1. Left open partial nephrectomy  2. Anemia due to operative blood loss  3.  Post operative pain- stable  4. HGB stable. PLAN   1. hgb remains stable  2.  Discharge home today    Shannan Rapp, GAGE - CNP 11/10/2020 8:24 AM

## 2020-11-10 NOTE — DISCHARGE SUMMARY
Urology Discharge Summary      Patient Identification  Chacorta Handley is a 78 y.o. female. :  1940  Admit Date:  11/3/2020    Discharge date:  11/10/2020                                  Disposition: home    Discharge Diagnoses:   Patient Active Problem List   Diagnosis    Renal cell cancer, unspecified later[facility])       Surgery: Left open partial nephrectomy with Dr. Lulú Valderrama 11/3/2020    Activity:  No strenous activity/ exercise for 1 mo  Avoid straining, lifting > 20 lbs  Ok to drive 2-3 days post op    Condition on discharge: stable    Follow-up: With Dr. Lulú Valderrama next week    Hospital course: To OR for left open partial nephrectomy with Dr. Lulú Valderrama 11/3/2020. She had significant post-op pain causing difficulty with ambulation. PT was ordered and gradually her pain improved. On Saturday, she had a drop in her hemoglobin to 6.9 from post op anemia. 1 unit PRBC was transfused and her hgb has remained stable since that time. She is tolerating meals and voiding appropriately. Stable for discharge home. She will follow up with Dr. Lulú Valderrama next week to remove staples to the incision.     Bri Andre, APRN - CNP

## 2020-11-16 ENCOUNTER — HOSPITAL ENCOUNTER (OUTPATIENT)
Dept: GENERAL RADIOLOGY | Age: 80
Discharge: HOME | End: 2020-11-16
Payer: MEDICARE

## 2020-11-16 ENCOUNTER — HOSPITAL ENCOUNTER (OUTPATIENT)
Age: 80
Discharge: HOME | End: 2020-11-16
Payer: MEDICARE

## 2020-11-16 LAB
A/G RATIO: 1.2 (ref 1.1–2.2)
ALBUMIN SERPL-MCNC: 3.7 G/DL (ref 3.4–5)
ALP BLD-CCNC: 111 U/L (ref 40–129)
ALT SERPL-CCNC: 11 U/L (ref 10–40)
ANION GAP SERPL CALCULATED.3IONS-SCNC: 14 MMOL/L (ref 3–16)
AST SERPL-CCNC: 19 U/L (ref 15–37)
BILIRUB SERPL-MCNC: 0.6 MG/DL (ref 0–1)
BUN BLDV-MCNC: 22 MG/DL (ref 7–20)
CALCIUM SERPL-MCNC: 9.2 MG/DL (ref 8.3–10.6)
CHLORIDE BLD-SCNC: 102 MMOL/L (ref 99–110)
CO2: 23 MMOL/L (ref 21–32)
CREAT SERPL-MCNC: 1.9 MG/DL (ref 0.6–1.2)
GFR AFRICAN AMERICAN: 31
GFR NON-AFRICAN AMERICAN: 25
GLOBULIN: 3.2 G/DL
GLUCOSE BLD-MCNC: 101 MG/DL (ref 70–99)
HCT VFR BLD CALC: 25.2 % (ref 36–48)
HEMOGLOBIN: 8.4 G/DL (ref 12–16)
MCH RBC QN AUTO: 29.1 PG (ref 26–34)
MCHC RBC AUTO-ENTMCNC: 33.3 G/DL (ref 31–36)
MCV RBC AUTO: 87.3 FL (ref 80–100)
PDW BLD-RTO: 13.9 % (ref 12.4–15.4)
PLATELET # BLD: 360 K/UL (ref 135–450)
PMV BLD AUTO: 8.2 FL (ref 5–10.5)
POTASSIUM SERPL-SCNC: 4.1 MMOL/L (ref 3.5–5.1)
RBC # BLD: 2.88 M/UL (ref 4–5.2)
SODIUM BLD-SCNC: 139 MMOL/L (ref 136–145)
TOTAL PROTEIN: 6.9 G/DL (ref 6.4–8.2)
WBC # BLD: 11.2 K/UL (ref 4–11)

## 2020-11-16 PROCEDURE — 36415 COLL VENOUS BLD VENIPUNCTURE: CPT

## 2020-11-16 PROCEDURE — 80053 COMPREHEN METABOLIC PANEL: CPT

## 2020-11-16 PROCEDURE — 71046 X-RAY EXAM CHEST 2 VIEWS: CPT

## 2020-11-16 PROCEDURE — 85027 COMPLETE CBC AUTOMATED: CPT

## 2021-07-13 ENCOUNTER — HOSPITAL ENCOUNTER (OUTPATIENT)
Dept: CT IMAGING | Age: 81
Discharge: HOME | End: 2021-07-13
Payer: MEDICARE

## 2021-07-13 DIAGNOSIS — C64.9 MALIGNANT NEOPLASM OF KIDNEY EXCLUDING RENAL PELVIS, UNSPECIFIED LATERALITY (HCC): ICD-10-CM

## 2021-07-13 PROCEDURE — 74150 CT ABDOMEN W/O CONTRAST: CPT

## 2021-07-13 PROCEDURE — 74176 CT ABD & PELVIS W/O CONTRAST: CPT

## 2021-07-20 LAB
ANION GAP SERPL CALCULATED.3IONS-SCNC: 15 MMOL/L (ref 3–16)
BUN BLDV-MCNC: 35 MG/DL (ref 7–20)
CALCIUM SERPL-MCNC: 9 MG/DL (ref 8.3–10.6)
CHLORIDE BLD-SCNC: 104 MMOL/L (ref 99–110)
CO2: 21 MMOL/L (ref 21–32)
CREAT SERPL-MCNC: 2 MG/DL (ref 0.6–1.2)
GFR AFRICAN AMERICAN: 29
GFR NON-AFRICAN AMERICAN: 24
GLUCOSE BLD-MCNC: 110 MG/DL (ref 70–99)
POTASSIUM SERPL-SCNC: 4.7 MMOL/L (ref 3.5–5.1)
SODIUM BLD-SCNC: 140 MMOL/L (ref 136–145)

## 2022-07-01 ENCOUNTER — HOSPITAL ENCOUNTER (OUTPATIENT)
Dept: CT IMAGING | Age: 82
Discharge: HOME | End: 2022-07-01
Payer: MEDICARE

## 2022-07-01 ENCOUNTER — HOSPITAL ENCOUNTER (OUTPATIENT)
Age: 82
Setting detail: SPECIMEN
Discharge: HOME | End: 2022-07-01
Payer: MEDICARE

## 2022-07-01 ENCOUNTER — HOSPITAL ENCOUNTER (OUTPATIENT)
Dept: GENERAL RADIOLOGY | Age: 82
Discharge: HOME | End: 2022-07-01
Payer: MEDICARE

## 2022-07-01 ENCOUNTER — HOSPITAL ENCOUNTER (OUTPATIENT)
Age: 82
Discharge: HOME | End: 2022-07-01
Payer: MEDICARE

## 2022-07-01 DIAGNOSIS — C64.9 MALIGNANT NEOPLASM OF KIDNEY, UNSPECIFIED LATERALITY (HCC): ICD-10-CM

## 2022-07-01 DIAGNOSIS — C64.9 MALIGNANT NEOPLASM OF KIDNEY EXCLUDING RENAL PELVIS, UNSPECIFIED LATERALITY (HCC): ICD-10-CM

## 2022-07-01 LAB
ANION GAP SERPL CALCULATED.3IONS-SCNC: 15 MMOL/L (ref 3–16)
BUN BLDV-MCNC: 40 MG/DL (ref 7–20)
CALCIUM SERPL-MCNC: 9.6 MG/DL (ref 8.3–10.6)
CHLORIDE BLD-SCNC: 100 MMOL/L (ref 99–110)
CO2: 22 MMOL/L (ref 21–32)
CREAT SERPL-MCNC: 1.8 MG/DL (ref 0.6–1.2)
GFR AFRICAN AMERICAN: 32
GFR NON-AFRICAN AMERICAN: 27
GLUCOSE BLD-MCNC: 109 MG/DL (ref 70–99)
POTASSIUM SERPL-SCNC: 4.5 MMOL/L (ref 3.5–5.1)
SODIUM BLD-SCNC: 137 MMOL/L (ref 136–145)

## 2022-07-01 PROCEDURE — 71046 X-RAY EXAM CHEST 2 VIEWS: CPT

## 2022-07-01 PROCEDURE — 74176 CT ABD & PELVIS W/O CONTRAST: CPT

## 2022-07-01 PROCEDURE — 80048 BASIC METABOLIC PNL TOTAL CA: CPT

## 2022-07-01 PROCEDURE — 36415 COLL VENOUS BLD VENIPUNCTURE: CPT

## 2023-07-12 ENCOUNTER — HOSPITAL ENCOUNTER (OUTPATIENT)
Dept: GENERAL RADIOLOGY | Age: 83
Discharge: HOME | End: 2023-07-12
Attending: UROLOGY
Payer: MEDICARE

## 2023-07-12 ENCOUNTER — HOSPITAL ENCOUNTER (OUTPATIENT)
Dept: CT IMAGING | Age: 83
Discharge: HOME | End: 2023-07-12
Attending: UROLOGY
Payer: MEDICARE

## 2023-07-12 ENCOUNTER — HOSPITAL ENCOUNTER (OUTPATIENT)
Age: 83
Discharge: HOME | End: 2023-07-12
Attending: UROLOGY
Payer: MEDICARE

## 2023-07-12 DIAGNOSIS — C64.9 MALIGNANT NEOPLASM OF KIDNEY EXCLUDING RENAL PELVIS, UNSPECIFIED LATERALITY (HCC): ICD-10-CM

## 2023-07-12 DIAGNOSIS — C64.9 RENAL CELL CANCER, UNSPECIFIED LATERALITY (HCC): ICD-10-CM

## 2023-07-12 DIAGNOSIS — N28.1 CYST OF KIDNEY, ACQUIRED: ICD-10-CM

## 2023-07-12 LAB
ANION GAP SERPL CALCULATED.3IONS-SCNC: 11 MMOL/L (ref 3–16)
BUN SERPL-MCNC: 36 MG/DL (ref 7–20)
CALCIUM SERPL-MCNC: 9.1 MG/DL (ref 8.3–10.6)
CHLORIDE SERPL-SCNC: 104 MMOL/L (ref 99–110)
CO2 SERPL-SCNC: 23 MMOL/L (ref 21–32)
CREAT SERPL-MCNC: 2 MG/DL (ref 0.6–1.2)
GFR SERPLBLD CREATININE-BSD FMLA CKD-EPI: 24 ML/MIN/{1.73_M2}
GLUCOSE SERPL-MCNC: 156 MG/DL (ref 70–99)
POTASSIUM SERPL-SCNC: 4.5 MMOL/L (ref 3.5–5.1)
SODIUM SERPL-SCNC: 138 MMOL/L (ref 136–145)

## 2023-07-12 PROCEDURE — 71046 X-RAY EXAM CHEST 2 VIEWS: CPT

## 2023-07-12 PROCEDURE — 36415 COLL VENOUS BLD VENIPUNCTURE: CPT

## 2023-07-12 PROCEDURE — 80048 BASIC METABOLIC PNL TOTAL CA: CPT

## 2023-07-12 PROCEDURE — 74176 CT ABD & PELVIS W/O CONTRAST: CPT

## 2024-04-30 ENCOUNTER — HOSPITAL ENCOUNTER (OUTPATIENT)
Dept: GENERAL RADIOLOGY | Age: 84
Discharge: HOME | End: 2024-04-30
Payer: MEDICARE

## 2024-04-30 ENCOUNTER — HOSPITAL ENCOUNTER (OUTPATIENT)
Age: 84
Discharge: HOME | End: 2024-04-30
Payer: MEDICARE

## 2024-04-30 DIAGNOSIS — R05.9 COUGH, UNSPECIFIED TYPE: ICD-10-CM

## 2024-04-30 PROCEDURE — 71046 X-RAY EXAM CHEST 2 VIEWS: CPT

## 2024-09-09 ENCOUNTER — APPOINTMENT (OUTPATIENT)
Dept: CT IMAGING | Age: 84
End: 2024-09-09
Attending: EMERGENCY MEDICINE

## 2024-09-09 ENCOUNTER — APPOINTMENT (OUTPATIENT)
Dept: GENERAL RADIOLOGY | Age: 84
End: 2024-09-09
Attending: EMERGENCY MEDICINE

## 2024-09-09 ENCOUNTER — HOSPITAL ENCOUNTER (EMERGENCY)
Age: 84
Discharge: HOME | End: 2024-09-09
Attending: EMERGENCY MEDICINE

## 2024-09-09 VITALS
RESPIRATION RATE: 16 BRPM | WEIGHT: 198.19 LBS | BODY MASS INDEX: 30.04 KG/M2 | TEMPERATURE: 98 F | HEIGHT: 68 IN | OXYGEN SATURATION: 97 % | SYSTOLIC BLOOD PRESSURE: 144 MMHG | DIASTOLIC BLOOD PRESSURE: 83 MMHG | HEART RATE: 50 BPM

## 2024-09-09 DIAGNOSIS — Z00.00 ROUTINE ADULT HEALTH MAINTENANCE: Primary | ICD-10-CM

## 2024-09-09 LAB
ANION GAP SERPL CALCULATED.3IONS-SCNC: 17 MMOL/L (ref 3–16)
BUN SERPL-MCNC: 36 MG/DL (ref 7–20)
CALCIUM SERPL-MCNC: 9.1 MG/DL (ref 8.3–10.6)
CHLORIDE SERPL-SCNC: 103 MMOL/L (ref 99–110)
CO2 SERPL-SCNC: 20 MMOL/L (ref 21–32)
CREAT SERPL-MCNC: 2 MG/DL (ref 0.6–1.2)
GFR SERPLBLD CREATININE-BSD FMLA CKD-EPI: 24 ML/MIN/{1.73_M2}
GLUCOSE SERPL-MCNC: 150 MG/DL (ref 70–99)
POTASSIUM SERPL-SCNC: 4.9 MMOL/L (ref 3.5–5.1)
SODIUM SERPL-SCNC: 140 MMOL/L (ref 136–145)

## 2024-09-09 PROCEDURE — 74176 CT ABD & PELVIS W/O CONTRAST: CPT

## 2024-09-09 PROCEDURE — 71046 X-RAY EXAM CHEST 2 VIEWS: CPT

## 2024-09-09 PROCEDURE — 36415 COLL VENOUS BLD VENIPUNCTURE: CPT

## 2024-09-09 PROCEDURE — 99284 EMERGENCY DEPT VISIT MOD MDM: CPT

## 2024-09-09 PROCEDURE — 80048 BASIC METABOLIC PNL TOTAL CA: CPT

## 2024-09-09 ASSESSMENT — ENCOUNTER SYMPTOMS
CONSTIPATION: 0
RHINORRHEA: 0
COUGH: 0
DIARRHEA: 0
NAUSEA: 0
SHORTNESS OF BREATH: 0
BACK PAIN: 0
VOMITING: 0
EYE PAIN: 0
EYE REDNESS: 0
ABDOMINAL PAIN: 0

## 2024-09-09 ASSESSMENT — PAIN - FUNCTIONAL ASSESSMENT: PAIN_FUNCTIONAL_ASSESSMENT: NONE - DENIES PAIN

## (undated) DEVICE — LOOP,VESSEL,MAXI,BLUE,2/PK,STERILE: Brand: MEDLINE

## (undated) DEVICE — SUTURE VCRL SZ 0 L36IN ABSRB UD L48MM CTX 1/2 CIR J978H

## (undated) DEVICE — APPLIER CLP L L13IN TI MULT RNG HNDL 20 CLP STR LIGACLP

## (undated) DEVICE — SOLUTION IV IRRIG POUR BRL 0.9% SODIUM CHL 2F7124

## (undated) DEVICE — SUTURE VCRL UD BR CT 3-0 18IN CT1 J838D

## (undated) DEVICE — Z DUP USE 2641840 CLIP INT L POLYMER LOK LIG HEM O LOK

## (undated) DEVICE — Device

## (undated) DEVICE — TOTAL TRAY, 16FR 10ML SIL FOLEY, URN: Brand: MEDLINE

## (undated) DEVICE — SET TRNQT W/ STD 7IN 12FR 5 TB 1 SNR DLP

## (undated) DEVICE — ELECTRODE BLDE L6.5IN CAUT EXT DISP

## (undated) DEVICE — SUTURE CHROMIC GUT SZ 3-0 L27IN ABSRB BRN L26MM SH 1/2 CIR G122H

## (undated) DEVICE — CLEANER,CAUTERY TIP,2X2",STERILE: Brand: MEDLINE

## (undated) DEVICE — STAPLER SKIN H3.9MM WIRE DIA0.58MM CRWN 6.9MM 35 STPL FIX

## (undated) DEVICE — ELECTRODE PT RET AD L9FT HI MOIST COND ADH HYDRGEL CORDED

## (undated) DEVICE — CATHETER F BLLN 5CC 20FR 3 W SPEC SHT RND TIP TWO OPP DRNGE

## (undated) DEVICE — SUTURE PDS II SZ 1 L96IN ABSRB VLT TP-1 L65MM 1/2 CIR Z880G

## (undated) DEVICE — TUBING, SUCTION, 1/4" X 12', STRAIGHT: Brand: MEDLINE

## (undated) DEVICE — COVER LT HNDL BLU PLAS

## (undated) DEVICE — SYRINGE MED 10ML LUERLOCK TIP W/O SFTY DISP

## (undated) DEVICE — SUTURE PERMAHAND SZ 0 L30IN NONABSORBABLE BLK SILK BRAID A306H

## (undated) DEVICE — DRAIN SURG FLAT W7MMXL20CM FULL PERF

## (undated) DEVICE — APPLICATOR MEDICATED 26 CC SOLUTION HI LT ORNG CHLORAPREP

## (undated) DEVICE — DRAPE,INSTRUMENT,MAGNETIC,10X16: Brand: MEDLINE

## (undated) DEVICE — SPONGE LAP W18XL18IN WHT COT 4 PLY FLD STRUNG RADPQ DISP ST

## (undated) DEVICE — GLOVE ORANGE PI 8 1/2   MSG9085

## (undated) DEVICE — SUTURE PERMAHAND SZ 2-0 L30IN NONABSORBABLE BLK SILK W/O A305H

## (undated) DEVICE — SPONGE GZ W4XL4IN COT 12 PLY TYP VII WVN C FLD DSGN

## (undated) DEVICE — SUTURE ABSORBABLE BRAIDED 2-0 CT-1 27 IN UD VICRYL J259H

## (undated) DEVICE — APPLIER LIG CLP M L11IN TI STR RNG HNDL FOR 20 CLP DISP

## (undated) DEVICE — PENROSE DRAIN 12" X 1/4: Brand: CARDINAL HEALTH

## (undated) DEVICE — DRAPE,CHEST,FENES,15X10,STERIL: Brand: MEDLINE

## (undated) DEVICE — SUTURE CHROMIC GUT SZ 2-0 L27IN ABSRB BRN L26MM SH 1/2 CIR G123H

## (undated) DEVICE — AGENT HEMSTAT W2XL4IN OXIDIZED REGENERATED CELOS ABSRB

## (undated) DEVICE — CANISTER, RIGID, 1200CC: Brand: MEDLINE INDUSTRIES, INC.

## (undated) DEVICE — MERCY HEALTH WEST TURNOVER: Brand: MEDLINE INDUSTRIES, INC.

## (undated) DEVICE — APPLIER SUT CLP FOR 2-0 3-0 4-0 VCRL ABSRB SUT

## (undated) DEVICE — 3M™ IOBAN™ 2 ANTIMICROBIAL INCISE DRAPE 6650EZ: Brand: IOBAN™ 2

## (undated) DEVICE — COVER,TABLE,44X90,STERILE: Brand: MEDLINE

## (undated) DEVICE — PAD,ABDOMINAL,8"X7.5",STERILE,LF,1/PK: Brand: MEDLINE

## (undated) DEVICE — SUTURE PERMA-HAND SZ 2-0 L30IN NONABSORBABLE BLK L26MM SH K833H